# Patient Record
Sex: MALE | Race: WHITE | Employment: FULL TIME | URBAN - NONMETROPOLITAN AREA
[De-identification: names, ages, dates, MRNs, and addresses within clinical notes are randomized per-mention and may not be internally consistent; named-entity substitution may affect disease eponyms.]

---

## 2022-03-15 ENCOUNTER — TELEPHONE (OUTPATIENT)
Dept: NEUROLOGY | Age: 62
End: 2022-03-15

## 2022-04-29 ENCOUNTER — TELEPHONE (OUTPATIENT)
Dept: NEUROLOGY | Age: 62
End: 2022-04-29

## 2022-04-29 NOTE — TELEPHONE ENCOUNTER
CALLED PATIENT AND LEFT A VM THAT HE HAS TO BRING IN DISC OF CT IF PATIENT DOES NOT HAVE DISC WHEN HE COMES IN HE WILL NEED TO RESCHEDULE.

## 2022-05-06 ENCOUNTER — OFFICE VISIT (OUTPATIENT)
Dept: NEUROLOGY | Age: 62
End: 2022-05-06
Payer: MEDICAID

## 2022-05-06 VITALS
BODY MASS INDEX: 31.71 KG/M2 | HEIGHT: 67 IN | RESPIRATION RATE: 16 BRPM | SYSTOLIC BLOOD PRESSURE: 138 MMHG | HEART RATE: 98 BPM | DIASTOLIC BLOOD PRESSURE: 82 MMHG | WEIGHT: 202 LBS

## 2022-05-06 DIAGNOSIS — Z86.39 HISTORY OF DIABETES MELLITUS: ICD-10-CM

## 2022-05-06 DIAGNOSIS — M79.671 PAIN IN BOTH FEET: ICD-10-CM

## 2022-05-06 DIAGNOSIS — G62.9 NEUROPATHY: ICD-10-CM

## 2022-05-06 DIAGNOSIS — M79.672 PAIN IN BOTH FEET: ICD-10-CM

## 2022-05-06 DIAGNOSIS — G62.9 NEUROPATHY: Primary | ICD-10-CM

## 2022-05-06 LAB
FOLATE: 13.8 NG/ML (ref 4.5–32.2)
SEDIMENTATION RATE, ERYTHROCYTE: 41 MM/HR (ref 0–15)
VITAMIN B-12: 379 PG/ML (ref 211–946)

## 2022-05-06 PROCEDURE — 3017F COLORECTAL CA SCREEN DOC REV: CPT | Performed by: PSYCHIATRY & NEUROLOGY

## 2022-05-06 PROCEDURE — G8417 CALC BMI ABV UP PARAM F/U: HCPCS | Performed by: PSYCHIATRY & NEUROLOGY

## 2022-05-06 PROCEDURE — G8427 DOCREV CUR MEDS BY ELIG CLIN: HCPCS | Performed by: PSYCHIATRY & NEUROLOGY

## 2022-05-06 PROCEDURE — 4004F PT TOBACCO SCREEN RCVD TLK: CPT | Performed by: PSYCHIATRY & NEUROLOGY

## 2022-05-06 PROCEDURE — 99204 OFFICE O/P NEW MOD 45 MIN: CPT | Performed by: PSYCHIATRY & NEUROLOGY

## 2022-05-06 RX ORDER — GABAPENTIN 300 MG/1
CAPSULE ORAL
COMMUNITY
Start: 2022-04-20

## 2022-05-06 RX ORDER — IBUPROFEN 200 MG
TABLET ORAL
COMMUNITY

## 2022-05-06 RX ORDER — TADALAFIL 20 MG/1
TABLET ORAL
COMMUNITY

## 2022-05-06 RX ORDER — DULOXETIN HYDROCHLORIDE 30 MG/1
30 CAPSULE, DELAYED RELEASE ORAL DAILY
Qty: 30 CAPSULE | Refills: 2 | Status: SHIPPED | OUTPATIENT
Start: 2022-05-06 | End: 2022-06-03 | Stop reason: SDUPTHER

## 2022-05-06 RX ORDER — CARVEDILOL 25 MG/1
TABLET ORAL
COMMUNITY
Start: 2022-04-20

## 2022-05-06 NOTE — PROGRESS NOTES
REVIEW OF SYSTEMS    Constitutional: []Fever []Sweats []Chills [] Recent Injury   [x] Denies all unless marked  HENT:[]Headache  [] Head Injury  [] Sore Throat  [] Ear Pain  [] Dizziness [] Hearing Loss   [x] Denies all unless marked  Musculoskeletal: [] Arthralgia  [] Myalgias [] Muscle cramps  [] Muscle twitches   [x] Denies all unless marked   Spine:  [] Neck pain  [] Back pain  [] Sciaticia  [x] Denies all unless marked  Neurological:[] Visual Disturbance [] Double Vision [] Slurred Speech [] Trouble swallowing  [] Vertigo [x] Tingling [x] Numbness [] Weakness [] Loss of Balance   [x] Loss of Consciousness [] Memory Loss [] Seizures  [] Denies all unless marked  Psychiatric/Behavioral:[] Depression [] Anxiety  [x] Denies all unless marked  Sleep: []  Insomnia [] Sleep Disturbance [] Snoring [] Restless Legs [] Daytime Sleepiness [] Sleep Apnea  [x] Denies all unless marked
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
## 2022-05-08 LAB
ANA IGG, ELISA: NORMAL
ARSENIC BLOOD: <10 UG/L
CADMIUM: <1 UG/L
GLIADIN ANTIBODIES IGA: 4 UNITS (ref 0–19)
GLIADIN ANTIBODIES IGG: 2 UNITS (ref 0–19)
LEAD LEVEL BLOOD: <2 UG/DL
MERCURY BLOOD: <2.5 UG/L

## 2022-05-09 LAB
ALBUMIN SERPL-MCNC: 4.64 G/DL (ref 3.75–5.01)
ALPHA-1-GLOBULIN: 0.34 G/DL (ref 0.19–0.46)
ALPHA-2-GLOBULIN: 1.01 G/DL (ref 0.48–1.05)
BETA GLOBULIN: 1.02 G/DL (ref 0.48–1.1)
GAMMA GLOBULIN: 1.2 G/DL (ref 0.62–1.51)
IMMUNOFIXATION REFLEX: NORMAL
METHYLMALONIC ACID: 0.11 UMOL/L (ref 0–0.4)
SPE/IFE INTERPRETATION: NORMAL
TOTAL PROTEIN: 8.2 G/DL (ref 6.3–8.2)

## 2022-05-11 LAB — VITAMIN B6: 31.2 NMOL/L (ref 20–125)

## 2022-05-17 ENCOUNTER — HOSPITAL ENCOUNTER (OUTPATIENT)
Dept: NEUROLOGY | Age: 62
Discharge: HOME | End: 2022-05-17
Payer: MEDICAID

## 2022-05-17 DIAGNOSIS — G62.9 NEUROPATHY: ICD-10-CM

## 2022-05-17 DIAGNOSIS — M79.671 PAIN IN BOTH FEET: ICD-10-CM

## 2022-05-17 DIAGNOSIS — M79.672 PAIN IN BOTH FEET: ICD-10-CM

## 2022-05-17 PROCEDURE — 95909 NRV CNDJ TST 5-6 STUDIES: CPT

## 2022-05-17 PROCEDURE — 95909 NRV CNDJ TST 5-6 STUDIES: CPT | Performed by: PSYCHIATRY & NEUROLOGY

## 2022-05-17 PROCEDURE — 95886 MUSC TEST DONE W/N TEST COMP: CPT

## 2022-05-17 PROCEDURE — 95886 MUSC TEST DONE W/N TEST COMP: CPT | Performed by: PSYCHIATRY & NEUROLOGY

## 2022-05-17 NOTE — PROCEDURES
[facility] [address] [facility]                             ELECTROMYOGRAM REPORT    PATIENT NAME: Merry Crocker                    :        1960  MED REC NO:   [id number]                              ROOM:  ACCOUNT NO:   [de-identified]                           ADMIT DATE: 2022  PROVIDER:     Kalen Lind MD    DATE OF EM2022    EMG/NERVE STUDY BILATERAL LOWER EXTREMITIES    INDICATION FOR TEST:  Bilateral lower extremity pain. SUMMARY:  Nerve conduction studies of the bilateral lower extremities  show absent sural and left peroneal and tibial responses. Obtainable  right peroneal and tibial responses show very low amplitude nerves. The  right tibial response is absent as well. Needle exam of the bilateral lower extremities is unremarkable. IMPRESSION:  Severe sensory motor polyneuropathy. Correlate clinically.         Herb Astudillo MD    D: 2022 12:25:29      T: 2022 12:38:46     [id number]  Job#: [id number]     Doc#: [id number]    CC:

## 2022-05-25 ENCOUNTER — TELEPHONE (OUTPATIENT)
Dept: NEUROLOGY | Age: 62
End: 2022-05-25

## 2022-05-25 NOTE — TELEPHONE ENCOUNTER
Caprice King from [facility] Dr[facility] Thom Steel requesting that notes from pts first visit and results from the EEG be sent to their office via Fax [phone]. Thanks!

## 2022-05-25 NOTE — TELEPHONE ENCOUNTER
Pt hasnt been seen in our office. Those note are from neurology and they will have to be the ones to release and fax those notes.  We cant release anything not from our providers

## 2022-06-03 ENCOUNTER — OFFICE VISIT (OUTPATIENT)
Dept: NEUROLOGY | Age: 62
End: 2022-06-03
Payer: MEDICAID

## 2022-06-03 VITALS
DIASTOLIC BLOOD PRESSURE: 64 MMHG | HEIGHT: 67 IN | RESPIRATION RATE: 18 BRPM | SYSTOLIC BLOOD PRESSURE: 129 MMHG | HEART RATE: 90 BPM | WEIGHT: 199 LBS | BODY MASS INDEX: 31.23 KG/M2

## 2022-06-03 DIAGNOSIS — Z86.39 HISTORY OF DIABETES MELLITUS: ICD-10-CM

## 2022-06-03 DIAGNOSIS — G62.9 NEUROPATHY: Primary | ICD-10-CM

## 2022-06-03 DIAGNOSIS — M79.672 PAIN IN BOTH FEET: ICD-10-CM

## 2022-06-03 DIAGNOSIS — M79.671 PAIN IN BOTH FEET: ICD-10-CM

## 2022-06-03 PROCEDURE — G8427 DOCREV CUR MEDS BY ELIG CLIN: HCPCS | Performed by: PSYCHIATRY & NEUROLOGY

## 2022-06-03 PROCEDURE — 99213 OFFICE O/P EST LOW 20 MIN: CPT | Performed by: PSYCHIATRY & NEUROLOGY

## 2022-06-03 PROCEDURE — 3017F COLORECTAL CA SCREEN DOC REV: CPT | Performed by: PSYCHIATRY & NEUROLOGY

## 2022-06-03 PROCEDURE — G8417 CALC BMI ABV UP PARAM F/U: HCPCS | Performed by: PSYCHIATRY & NEUROLOGY

## 2022-06-03 PROCEDURE — 4004F PT TOBACCO SCREEN RCVD TLK: CPT | Performed by: PSYCHIATRY & NEUROLOGY

## 2022-06-03 RX ORDER — DULOXETIN HYDROCHLORIDE 60 MG/1
60 CAPSULE, DELAYED RELEASE ORAL DAILY
Qty: 30 CAPSULE | Refills: 5 | Status: SHIPPED | OUTPATIENT
Start: 2022-06-03

## 2022-06-03 NOTE — PROGRESS NOTES
REVIEW OF SYSTEMS    Constitutional: []Fever []Sweats []Chills [] Recent Injury   [x] Denies all unless marked  HENT:[]Headache  [] Head Injury  [] Sore Throat  [] Ear Pain  [] Dizziness [] Hearing Loss   [x] Denies all unless marked  Musculoskeletal: [] Arthralgia  [] Myalgias [] Muscle cramps  [] Muscle twitches   [x] Denies all unless marked   Spine:  [] Neck pain  [] Back pain  [] Sciaticia  [x] Denies all unless marked  Neurological:[] Visual Disturbance [] Double Vision [] Slurred Speech [] Trouble swallowing  [] Vertigo [] Tingling [] Numbness [x] Weakness [] Loss of Balance   [] Loss of Consciousness [] Memory Loss [] Seizures  [] Denies all unless marked  Psychiatric/Behavioral:[] Depression [] Anxiety  [x] Denies all unless marked  Sleep: []  Insomnia [] Sleep Disturbance [] Snoring [] Restless Legs [] Daytime Sleepiness [] Sleep Apnea  [x] Denies all unless marked
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
## 2022-09-07 ENCOUNTER — OFFICE VISIT (OUTPATIENT)
Dept: NEUROLOGY | Age: 62
End: 2022-09-07
Payer: MEDICAID

## 2022-09-07 VITALS
HEIGHT: 67 IN | HEART RATE: 97 BPM | SYSTOLIC BLOOD PRESSURE: 134 MMHG | BODY MASS INDEX: 29.82 KG/M2 | DIASTOLIC BLOOD PRESSURE: 73 MMHG | RESPIRATION RATE: 16 BRPM | WEIGHT: 190 LBS

## 2022-09-07 DIAGNOSIS — G62.9 NEUROPATHY: Primary | ICD-10-CM

## 2022-09-07 DIAGNOSIS — M79.672 PAIN IN BOTH FEET: ICD-10-CM

## 2022-09-07 DIAGNOSIS — Z86.39 HISTORY OF DIABETES MELLITUS: ICD-10-CM

## 2022-09-07 DIAGNOSIS — M79.671 PAIN IN BOTH FEET: ICD-10-CM

## 2022-09-07 PROCEDURE — 99214 OFFICE O/P EST MOD 30 MIN: CPT | Performed by: PSYCHIATRY & NEUROLOGY

## 2022-09-07 PROCEDURE — 4004F PT TOBACCO SCREEN RCVD TLK: CPT | Performed by: PSYCHIATRY & NEUROLOGY

## 2022-09-07 PROCEDURE — 3017F COLORECTAL CA SCREEN DOC REV: CPT | Performed by: PSYCHIATRY & NEUROLOGY

## 2022-09-07 PROCEDURE — G8417 CALC BMI ABV UP PARAM F/U: HCPCS | Performed by: PSYCHIATRY & NEUROLOGY

## 2022-09-07 PROCEDURE — G8427 DOCREV CUR MEDS BY ELIG CLIN: HCPCS | Performed by: PSYCHIATRY & NEUROLOGY

## 2022-09-07 RX ORDER — NAPROXEN 500 MG/1
TABLET ORAL
COMMUNITY

## 2022-09-07 RX ORDER — NORTRIPTYLINE HYDROCHLORIDE 10 MG/1
CAPSULE ORAL
Qty: 60 CAPSULE | Refills: 3 | Status: SHIPPED | OUTPATIENT
Start: 2022-09-07
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
## 2022-12-08 ENCOUNTER — OFFICE VISIT (OUTPATIENT)
Dept: NEUROLOGY | Age: 62
End: 2022-12-08
Payer: MEDICAID

## 2022-12-08 VITALS
HEART RATE: 97 BPM | HEIGHT: 67 IN | OXYGEN SATURATION: 97 % | WEIGHT: 190 LBS | SYSTOLIC BLOOD PRESSURE: 132 MMHG | DIASTOLIC BLOOD PRESSURE: 77 MMHG | BODY MASS INDEX: 29.82 KG/M2

## 2022-12-08 DIAGNOSIS — M79.671 PAIN IN BOTH FEET: ICD-10-CM

## 2022-12-08 DIAGNOSIS — Z86.39 HISTORY OF DIABETES MELLITUS: ICD-10-CM

## 2022-12-08 DIAGNOSIS — G62.9 NEUROPATHY: Primary | ICD-10-CM

## 2022-12-08 DIAGNOSIS — M79.672 PAIN IN BOTH FEET: ICD-10-CM

## 2022-12-08 PROCEDURE — G8484 FLU IMMUNIZE NO ADMIN: HCPCS | Performed by: PSYCHIATRY & NEUROLOGY

## 2022-12-08 PROCEDURE — G8417 CALC BMI ABV UP PARAM F/U: HCPCS | Performed by: PSYCHIATRY & NEUROLOGY

## 2022-12-08 PROCEDURE — 99214 OFFICE O/P EST MOD 30 MIN: CPT | Performed by: PSYCHIATRY & NEUROLOGY

## 2022-12-08 PROCEDURE — G8427 DOCREV CUR MEDS BY ELIG CLIN: HCPCS | Performed by: PSYCHIATRY & NEUROLOGY

## 2022-12-08 PROCEDURE — 3017F COLORECTAL CA SCREEN DOC REV: CPT | Performed by: PSYCHIATRY & NEUROLOGY

## 2022-12-08 PROCEDURE — 4004F PT TOBACCO SCREEN RCVD TLK: CPT | Performed by: PSYCHIATRY & NEUROLOGY

## 2022-12-08 RX ORDER — DULOXETIN HYDROCHLORIDE 60 MG/1
60 CAPSULE, DELAYED RELEASE ORAL DAILY
Qty: 30 CAPSULE | Refills: 5 | Status: SHIPPED | OUTPATIENT
Start: 2022-12-08

## 2022-12-08 RX ORDER — HYDROCODONE BITARTRATE AND ACETAMINOPHEN 7.5; 325 MG/1; MG/1
1 TABLET ORAL EVERY 6 HOURS PRN
Qty: 30 TABLET | Refills: 0 | Status: SHIPPED | OUTPATIENT
Start: 2022-12-08 | End: 2023-01-07
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
## 2022-12-08 NOTE — PROGRESS NOTES
REVIEW OF SYSTEMS    Constitutional: []Fever []Sweat []Chills [] Recent Injury [x] Denies all unless marked  HEENT:[]Headache  [] Head Injury/Hearing Loss  [] Sore Throat  [] Ear Ache/Dizziness  [x] Denies all unless marked  Spine:  [] Neck pain  [] Back pain  [] Sciaticia  [x] Denies all unless marked  Cardiovascular:[]Heart Disease []Chest Pain [] Palpitations  [x] Denies all unless marked  Pulmonary: []Shortness of Breath []Cough   [x] Denies all unless marke  Gastrointestinal: []Nausea  []Vomiting  []Abdominal Pain  []Constipation  []Diarrhea  []Dark Bloody Stools  [x] Denies all unless marked  Psychiatric/Behavioral:[] Depression [] Anxiety [x] Denies all unless marked  Genitourinary:   [] Frequency  [] Urgency  [] Incontinence [] Pain with Urination  [x] Denies all unless marked  Extremities: [x]Pain  []Swelling  [x] Denies all unless marked  Musculoskeletal: [] Muscle Pain  [] Joint Pain  [] Arthritis [] Muscle Cramps [] Muscle Twitches  [x] Denies all unless marked  Sleep: [] Insomnia [] Snoring [] Restless Legs [] Sleep Apnea  [] Daytime Sleepiness  [x] Denies all unless marked  Skin:[] Rash [] Skin Discoloration [x] Denies all unless marked   Neurological: []Visual Disturbance/Memory Loss [] Loss of Balance [] Slurred Speech/Weakness [] Seizures  [] Vertigo/Dizziness [x] Denies all unless marked

## 2022-12-12 ENCOUNTER — TELEPHONE (OUTPATIENT)
Dept: NEUROLOGY | Age: 62
End: 2022-12-12

## 2022-12-12 NOTE — TELEPHONE ENCOUNTER
PA for patients Norco submitted via [facility] portal. Currently awaiting insurance response.  See details of authorization below:     Wilbert Putnam Monge: [facility]   PA Case ID: [id number]   Rx #: [id number]  Status  Sent to Plan today  Drug  HYDROcodone-Acetaminophen 7.5-325MG tablets  Form  [address] [facility]

## 2022-12-12 NOTE — TELEPHONE ENCOUNTER
Pt called and states he needs a PA for Hydrocodone please.  Pharmacy is [facility] in [place], [place].

## 2022-12-13 NOTE — TELEPHONE ENCOUNTER
[facility] Monge: Tiffanie LAGUNA Case ID: [id number] - Rx #: [id number] help? Call us at [phone]  Outcome  [place] December 12  The request has been approved. The authorization is effective for a maximum of 12 fills from 12/12/2022 to 12/11/2023, as long as the member is enrolled in their current health plan. The request was approved as submitted. A written notification letter will follow with additional details. Drug  HYDROcodone-Acetaminophen 7.5-325MG tablets  Form  [facility] Form 2017 [employer]  Original Claim Info  920,17 TRANS FEE = 0.00OPIOID MAXIMUM DURATION EXCEEDED.  PRESCRIBER MUST INITIATE THE PA REQUESTSUBMITTED DS LIMIT EXCEEDED0.00

## 2023-02-01 DIAGNOSIS — G62.9 NEUROPATHY: ICD-10-CM

## 2023-02-01 NOTE — TELEPHONE ENCOUNTER
Requested Prescriptions     Pending Prescriptions Disp Refills    HYDROcodone-acetaminophen (NORCO) 7.5-325 MG per tablet 30 tablet 0     Sig: Take 1 tablet by mouth every 6 hours as needed for Pain for up to 30 days.  Take 1 each pm as needed       Last Office Visit:  12/8/2022  Next Office Visit:  3/9/2023  Last Medication Refill: 12/14/2022 with 0 RENETTA Lopez up to date:  2/1/23     *RX updated to reflect   2/1/23  fill date*

## 2023-02-02 RX ORDER — HYDROCODONE BITARTRATE AND ACETAMINOPHEN 7.5; 325 MG/1; MG/1
1 TABLET ORAL EVERY 6 HOURS PRN
Qty: 30 TABLET | Refills: 0 | Status: SHIPPED | OUTPATIENT
Start: 2023-02-02 | End: 2023-03-04

## 2023-03-09 ENCOUNTER — OFFICE VISIT (OUTPATIENT)
Dept: NEUROLOGY | Age: 63
End: 2023-03-09

## 2023-03-09 VITALS
WEIGHT: 190 LBS | BODY MASS INDEX: 29.82 KG/M2 | OXYGEN SATURATION: 95 % | HEART RATE: 102 BPM | DIASTOLIC BLOOD PRESSURE: 76 MMHG | SYSTOLIC BLOOD PRESSURE: 154 MMHG | HEIGHT: 67 IN

## 2023-03-09 DIAGNOSIS — G62.9 NEUROPATHY: Primary | ICD-10-CM

## 2023-03-09 DIAGNOSIS — M79.671 PAIN IN BOTH FEET: ICD-10-CM

## 2023-03-09 DIAGNOSIS — M79.672 PAIN IN BOTH FEET: ICD-10-CM

## 2023-03-09 DIAGNOSIS — Z86.39 HISTORY OF DIABETES MELLITUS: ICD-10-CM

## 2023-03-09 RX ORDER — HYDROCODONE BITARTRATE AND ACETAMINOPHEN 7.5; 325 MG/1; MG/1
1 TABLET ORAL NIGHTLY PRN
COMMUNITY

## 2023-03-09 RX ORDER — ROSUVASTATIN CALCIUM 5 MG/1
TABLET, COATED ORAL
COMMUNITY
Start: 2023-02-22

## 2023-03-09 NOTE — PROGRESS NOTES
REVIEW OF SYSTEMS    Constitutional: []Fever []Sweats []Chills [] Recent Injury   [x] Denies all unless marked  HENT:[]Headache  [] Head Injury  [] Sore Throat  [x] Ear Pain  [] Dizziness [] Hearing Loss   [] Denies all unless marked  Spine:  [] Neck pain  [x] Back pain  [x] Sciatica  [] Denies all unless marked  Cardiovascular:[]Chest Pain []Palpitations [] Heart Disease  [x] Denies all unless marked  Pulmonary: []Shortness of Breath []Cough   [x] Denies all unless marked  Gastrointestinal:  []Abdominal Pain  []Blood in Stool  [x]Diarrhea []Constipation []Nausea  []Vomiting  [] Denies all unless marked  Genitourinary:  [] Dysuria [] Frequency  [] Incontinence [] Urgency   [x] Denies all unless marked  Musculoskeletal: [x] Arthralgia  [x] Myalgias [x] Muscle cramps  [] Muscle twitches   [] Denies all unless marked   Extremities:   [x] Pain   [] Swelling   [] Denies all unless marked  Skin:[] Rash  [] Color Change  [x] Denies all unless marked  Neurological:[] Visual Disturbance [] Double Vision [] Slurred Speech [] Trouble swallowing  [] Vertigo [] Tingling [] Numbness [] Weakness [] Loss of Balance   [] Loss of Consciousness [] Memory Loss [] Seizures  [x] Denies all unless marked  Psychiatric/Behavioral:[] Depression [] Anxiety  [x] Denies all unless marked  Sleep: []  Insomnia [] Sleep Disturbance [] Snoring [] Restless Legs [] Daytime Sleepiness [] Sleep Apnea  [x] Denies all unless marked
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
## 2023-03-10 DIAGNOSIS — G62.9 NEUROPATHY: ICD-10-CM

## 2023-03-10 NOTE — TELEPHONE ENCOUNTER
Patient called stating that Dr. Fco Hartley was supposed to call patient in [place] and nothing has been called in.  Pt was just calling to see when that would be called in?

## 2023-03-13 RX ORDER — HYDROCODONE BITARTRATE AND ACETAMINOPHEN 7.5; 325 MG/1; MG/1
1 TABLET ORAL EVERY 6 HOURS PRN
Qty: 30 TABLET | Refills: 0 | Status: SHIPPED | OUTPATIENT
Start: 2023-03-13 | End: 2023-04-12

## 2023-03-13 NOTE — TELEPHONE ENCOUNTER
Requested Prescriptions     Pending Prescriptions Disp Refills    HYDROcodone-acetaminophen (NORCO) 7.5-325 MG per tablet 30 tablet 0     Sig: Take 1 tablet by mouth every 6 hours as needed for Pain for up to 30 days.  Take 1 each pm as needed       Last Office Visit:  3/9/2023  Next Office Visit:  6/12/2023  Last Medication Refill:  2/2/2023 with 0 RF   Jacinda Listen up to date:  2/1/2023     *RX updated to reflect   3/13/2023  fill date*

## 2023-03-29 ENCOUNTER — TELEPHONE (OUTPATIENT)
Dept: VASCULAR SURGERY | Age: 63
End: 2023-03-29

## 2023-03-29 NOTE — TELEPHONE ENCOUNTER
Called and lvm with patient to schedule from referral. If patient returns call, please place them in a 30 minute new patient slot with Sun Rivera as NP- [id number]-10-CM Occlusion and stenosis of left carotid artery R09.89ICD-10-CM Other specified symptoms and signs involving the circulatory and respiratory systems- [id number]

## 2023-04-04 ENCOUNTER — OFFICE VISIT (OUTPATIENT)
Dept: VASCULAR SURGERY | Age: 63
End: 2023-04-04
Payer: MEDICAID

## 2023-04-04 ENCOUNTER — HOSPITAL ENCOUNTER (OUTPATIENT)
Dept: VASCULAR LAB | Age: 63
Discharge: HOME | End: 2023-04-04
Payer: MEDICAID

## 2023-04-04 VITALS
DIASTOLIC BLOOD PRESSURE: 64 MMHG | TEMPERATURE: 96.9 F | OXYGEN SATURATION: 98 % | BODY MASS INDEX: 29.82 KG/M2 | SYSTOLIC BLOOD PRESSURE: 110 MMHG | WEIGHT: 190 LBS | HEIGHT: 67 IN | HEART RATE: 97 BPM

## 2023-04-04 DIAGNOSIS — I65.23 BILATERAL CAROTID ARTERY STENOSIS: ICD-10-CM

## 2023-04-04 DIAGNOSIS — I65.23 BILATERAL CAROTID ARTERY STENOSIS: Primary | ICD-10-CM

## 2023-04-04 PROCEDURE — 93880 EXTRACRANIAL BILAT STUDY: CPT

## 2023-04-04 PROCEDURE — 99204 OFFICE O/P NEW MOD 45 MIN: CPT | Performed by: PHYSICIAN ASSISTANT

## 2023-04-04 RX ORDER — ASPIRIN 325 MG/1
325 TABLET, COATED ORAL DAILY
COMMUNITY
Start: 2023-03-17

## 2023-04-04 RX ORDER — FENOFIBRATE 145 MG/1
TABLET, COATED ORAL
COMMUNITY
Start: 2023-01-29 | End: 2023-04-04

## 2023-04-04 RX ORDER — CHOLECALCIFEROL (VITAMIN D3) 25 MCG
1 TABLET ORAL DAILY
COMMUNITY
Start: 2023-02-24

## 2023-04-04 NOTE — PROGRESS NOTES
HYDROcodone-acetaminophen (NORCO) 7.5-325 MG per tablet Take 1 tablet by mouth every 6 hours as needed for Pain for up to 30 days. Take 1 each pm as needed 30 tablet 0    cyanocobalamin 1000 MCG tablet Take 1 tablet by mouth daily      rosuvastatin (CRESTOR) 5 MG tablet TAKE 1 TABLET BY MOUTH ONCE DAILY      DULoxetine (CYMBALTA) 60 MG extended release capsule Take 1 capsule by mouth daily 30 capsule 5    naproxen (NAPROSYN) 500 MG tablet naproxen 500 mg tablet      carvedilol (COREG) 25 MG tablet TAKE 1 TABLET BY MOUTH TWICE DAILY DOSE INCREASE FROM 12.5MG TO 25MG      gabapentin (NEURONTIN) 300 MG capsule TAKE 2 CAPSULES BY MOUTH THREE TIMES DAILY      metFORMIN (GLUCOPHAGE) 1000 MG tablet TAKE 1 TABLET BY MOUTH TWICE DAILY      tadalafil (CIALIS) 20 MG tablet tadalafil 20 mg tablet   Take 1 tablet every day by oral route. HYDROcodone-acetaminophen (NORCO) 7.5-325 MG per tablet Take 1 tablet by mouth nightly as needed for Pain. (Patient not taking: Reported on 4/4/2023)       No current facility-administered medications for this visit. Allergies: Propoxyphene and Soybean-containing drug products  History reviewed. No pertinent past medical history. Past Surgical History:   Procedure Laterality Date    INGUINAL HERNIA REPAIR      MANDIBLE SURGERY      SHOULDER SURGERY Left      History reviewed. No pertinent family history. Social History     Tobacco Use    Smoking status: Every Day     Packs/day: 0.50     Years: 48.00     Pack years: 24.00     Types: Cigarettes     Passive exposure: Current    Smokeless tobacco: Never   Substance Use Topics    Alcohol use: Never       Review of Systems    Constitutional -   No fever or chills   HENT - no HA's, tinnitus, rhinorrhea, sore throat  Eyes - no sudden vision change or amaurosis. Respiratory - no SOB or chest pain  Cardiovascular - no chest pain, syncope, or significant dizziness. No palpitations (see HPI)  Gastrointestinal - no significant abdominal pain.

## 2023-04-05 ENCOUNTER — TELEPHONE (OUTPATIENT)
Dept: VASCULAR SURGERY | Age: 63
End: 2023-04-05

## 2023-04-05 DIAGNOSIS — I65.23 BILATERAL CAROTID ARTERY STENOSIS: Primary | ICD-10-CM

## 2023-04-05 NOTE — TELEPHONE ENCOUNTER
Spoke with the patient to let him know the following. We have him scheduled for his CTA of the neck. The patient is aware of the date and time and to be npo 4-6 hours prior. ----- Message from Annabelle Davison PA-C sent at 4/5/2023 11:03 AM CDT -----  Please call the patient and let him know I reviewed his carotid ultrasound he does have significant blockage in his left carotid artery. I recommend we get a CTA of the neck for better imaging and to discuss possible surgery. If he is agreeable please place order.   Thank you

## 2023-04-28 ENCOUNTER — TELEPHONE (OUTPATIENT)
Dept: VASCULAR SURGERY | Age: 63
End: 2023-04-28

## 2023-04-28 DIAGNOSIS — G62.9 NEUROPATHY: ICD-10-CM

## 2023-04-28 RX ORDER — HYDROCODONE BITARTRATE AND ACETAMINOPHEN 7.5; 325 MG/1; MG/1
1 TABLET ORAL EVERY 6 HOURS PRN
Qty: 30 TABLET | Refills: 0 | Status: SHIPPED | OUTPATIENT
Start: 2023-04-28 | End: 2023-05-28

## 2023-04-28 NOTE — TELEPHONE ENCOUNTER
Requested Prescriptions     Pending Prescriptions Disp Refills    HYDROcodone-acetaminophen (NORCO) 7.5-325 MG per tablet 30 tablet 0     Sig: Take 1 tablet by mouth every 6 hours as needed for Pain for up to 30 days.  Take 1 each pm as needed       Last Office Visit:  3/9/2023  Next Office Visit:  6/12/2023  Last Medication Refill:  3/13/23  Jonny Crocker up to date:  2/1/23    *RX updated to reflect   4/28/23  fill date*

## 2023-06-19 DIAGNOSIS — G62.9 NEUROPATHY: ICD-10-CM

## 2023-06-19 RX ORDER — HYDROCODONE BITARTRATE AND ACETAMINOPHEN 7.5; 325 MG/1; MG/1
1 TABLET ORAL EVERY 6 HOURS PRN
Qty: 30 TABLET | Refills: 0 | Status: SHIPPED | OUTPATIENT
Start: 2023-06-19 | End: 2023-07-19

## 2023-06-19 NOTE — TELEPHONE ENCOUNTER
Requested Prescriptions     Pending Prescriptions Disp Refills    HYDROcodone-acetaminophen (NORCO) 7.5-325 MG per tablet 30 tablet 0     Sig: Take 1 tablet by mouth every 6 hours as needed for Pain for up to 30 days.  Take 1 each pm as needed       Last Office Visit:  6/15/2023  Next Office Visit:  9/18/2023  Last Medication Refill:  4/28/2023 with 0 RENETTA Rivas up to date:  2/1/2023 unable to update due to web site being down    *RX updated to reflect   6/19/2023   fill date*

## 2023-08-01 DIAGNOSIS — G62.9 NEUROPATHY: ICD-10-CM

## 2023-08-01 NOTE — TELEPHONE ENCOUNTER
Requested Prescriptions     Pending Prescriptions Disp Refills    HYDROcodone-acetaminophen (NORCO) 7.5-325 MG per tablet 30 tablet 0     Sig: Take 1 tablet by mouth every 6 hours as needed for Pain for up to 30 days.  Take 1 each pm as needed       Last Office Visit:  6/15/2023  Next Office Visit:  9/18/2023  Last Medication Refill:  6/19/2023 with 0 RENETTA Lindsey up to date:  8/1/2023    *RX updated to reflect   8/1/2023  fill date*

## 2023-08-02 RX ORDER — HYDROCODONE BITARTRATE AND ACETAMINOPHEN 7.5; 325 MG/1; MG/1
1 TABLET ORAL EVERY 6 HOURS PRN
Qty: 30 TABLET | Refills: 0 | Status: SHIPPED | OUTPATIENT
Start: 2023-08-02 | End: 2023-09-01

## 2023-09-11 DIAGNOSIS — G62.9 NEUROPATHY: ICD-10-CM

## 2023-09-11 RX ORDER — HYDROCODONE BITARTRATE AND ACETAMINOPHEN 7.5; 325 MG/1; MG/1
1 TABLET ORAL EVERY 6 HOURS PRN
Qty: 30 TABLET | Refills: 0 | Status: SHIPPED | OUTPATIENT
Start: 2023-09-11 | End: 2023-10-11

## 2023-09-11 NOTE — TELEPHONE ENCOUNTER
Requested Prescriptions     Pending Prescriptions Disp Refills    HYDROcodone-acetaminophen (NORCO) 7.5-325 MG per tablet 30 tablet 0     Sig: Take 1 tablet by mouth every 6 hours as needed for Pain for up to 30 days.  Take 1 each pm as needed       Last Office Visit:  6/15/2023  Next Office Visit:  9/18/2023  Last Medication Refill:  8/2/23  Claudio Luevano up to date:  9/11/23    *RX updated to reflect   9/11/23  fill date*

## 2023-09-18 ENCOUNTER — TELEPHONE (OUTPATIENT)
Dept: NEUROLOGY | Age: 63
End: 2023-09-18

## 2023-09-18 ENCOUNTER — OFFICE VISIT (OUTPATIENT)
Dept: NEUROLOGY | Age: 63
End: 2023-09-18
Payer: MEDICAID

## 2023-09-18 VITALS
SYSTOLIC BLOOD PRESSURE: 139 MMHG | DIASTOLIC BLOOD PRESSURE: 82 MMHG | BODY MASS INDEX: 31.39 KG/M2 | WEIGHT: 200 LBS | HEIGHT: 67 IN | HEART RATE: 98 BPM

## 2023-09-18 DIAGNOSIS — M54.42 CHRONIC LEFT-SIDED LOW BACK PAIN WITH LEFT-SIDED SCIATICA: ICD-10-CM

## 2023-09-18 DIAGNOSIS — Z86.39 HISTORY OF DIABETES MELLITUS: ICD-10-CM

## 2023-09-18 DIAGNOSIS — M79.671 PAIN IN BOTH FEET: ICD-10-CM

## 2023-09-18 DIAGNOSIS — G62.9 NEUROPATHY: Primary | ICD-10-CM

## 2023-09-18 DIAGNOSIS — M79.672 PAIN IN BOTH FEET: ICD-10-CM

## 2023-09-18 DIAGNOSIS — G89.29 CHRONIC LEFT-SIDED LOW BACK PAIN WITH LEFT-SIDED SCIATICA: ICD-10-CM

## 2023-09-18 PROCEDURE — 99214 OFFICE O/P EST MOD 30 MIN: CPT | Performed by: PSYCHIATRY & NEUROLOGY

## 2023-09-18 NOTE — PROGRESS NOTES
REVIEW OF SYSTEMS    Constitutional: []Fever []Sweat []Chills [] Recent Injury [x] Denies all unless marked  HEENT:[]Headache  [] Head Injury/Hearing Loss  [] Sore Throat  [] Ear Ache/Dizziness  [x] Denies all unless marked  Spine:  [] Neck pain  [] Back pain  [] Sciaticia  [x] Denies all unless marked  Cardiovascular:[]Heart Disease []Chest Pain [] Palpitations  [x] Denies all unless marked  Pulmonary: []Shortness of Breath []Cough   [x] Denies all unless marke  Gastrointestinal: []Nausea  []Vomiting  []Abdominal Pain  []Constipation  []Diarrhea  []Dark Bloody Stools  [x] Denies all unless marked  Psychiatric/Behavioral:[] Depression [] Anxiety [x] Denies all unless marked  Genitourinary:   [] Frequency  [] Urgency  [] Incontinence [] Pain with Urination  [x] Denies all unless marked  Extremities: []Pain  []Swelling  [x] Denies all unless marked  Musculoskeletal: [x] Muscle Pain  [] Joint Pain  [] Arthritis [x] Muscle Cramps [] Muscle Twitches  [x] Denies all unless marked  Sleep: [] Insomnia [] Snoring [] Restless Legs [] Sleep Apnea  [] Daytime Sleepiness  [x] Denies all unless marked  Skin:[] Rash [] Skin Discoloration [x] Denies all unless marked   Neurological: []Visual Disturbance/Memory Loss [] Loss of Balance [] Slurred Speech/Weakness [] Seizures  [] Vertigo/Dizziness [x] Denies all unless marked
alert, fluent oriented x 3 appropriate affect  Attention and concentration appear appropriate  Recent and remote memory appears unremarkable  Speech normal without dysarthria  No clear issues with language of fund of knowledge    Cranial Nerve Exam   CN II- Visual fields grossly unremarkable. VA adequate. CN III, IV,VI- PERRLA, EOMI, No nystagmus, conjugate eye movements, no ptosis  CN VII-slight facial asymmetry  CN VIII-Hearing intact   CN IX and X- Palate elevates in midline  CN XI-good shoulder shrug  CN XII-Tongue midline with no fasciculations or fibrillations    Motor Exam  V/V throughout upper and lower extremities bilaterally, no cogwheeling, normal tone      Reflexes trace left ankle. 1-2+ KJ and right AJ    Tremors- no tremors in hands or head noted    Gait  Slow and antalgic    Coordination  Finger to nose and IAN-unremarkable      Assessment    ICD-10-CM    1. Neuropathy  G62.9       2. Pain in both feet  M79.671     M79.672       3. History of diabetes mellitus  Z86.39       4. Chronic left-sided low back pain with left-sided sciatica  M54.42 MRI LUMBAR SPINE WO CONTRAST    G89.29                   Patient has a severe diabetic neuropathy, along with chronic progressive LBP. He will continue on Cymbalta and Norco each evening. .  Diabetes stable  No changes made. Check MRI of the low back and consider referral to neurosurgery if indicated. Plan        Return in about 3 months (around 12/18/2023).     (Please note that portions of this note were completed with a voice recognition program. Efforts were made to edit the dictations but occasionally words are mis-transcribed.)

## 2023-09-18 NOTE — TELEPHONE ENCOUNTER
Called and left patient a VM to let him know when his MRI has been scheduled. Left on VM the appointment time/date/location.

## 2023-10-05 ENCOUNTER — TELEPHONE (OUTPATIENT)
Dept: NEUROLOGY | Age: 63
End: 2023-10-05

## 2023-10-05 ENCOUNTER — HOSPITAL ENCOUNTER (OUTPATIENT)
Dept: MRI IMAGING | Age: 63
Discharge: HOME | End: 2023-10-05
Payer: MEDICAID

## 2023-10-05 DIAGNOSIS — M54.42 CHRONIC LEFT-SIDED LOW BACK PAIN WITH LEFT-SIDED SCIATICA: Primary | ICD-10-CM

## 2023-10-05 DIAGNOSIS — M54.42 CHRONIC LEFT-SIDED LOW BACK PAIN WITH LEFT-SIDED SCIATICA: ICD-10-CM

## 2023-10-05 DIAGNOSIS — G89.29 CHRONIC LEFT-SIDED LOW BACK PAIN WITH LEFT-SIDED SCIATICA: ICD-10-CM

## 2023-10-05 DIAGNOSIS — G89.29 CHRONIC LEFT-SIDED LOW BACK PAIN WITH LEFT-SIDED SCIATICA: Primary | ICD-10-CM

## 2023-10-05 PROCEDURE — 72148 MRI LUMBAR SPINE W/O DYE: CPT

## 2023-10-05 NOTE — TELEPHONE ENCOUNTER
MD Jelena Cervantes MA  His MRI shows some significant findings. I think we should refer him to neurosurgery. If he is okay refer him to one of our guys please. I spoke with pt and voiced that there were several significant findings on his Mri and Dr. Robbie Waggoner recommends a referral to the neurosurgeons at [facility] for an evaluation. Pt was agreeable.

## 2023-10-06 ENCOUNTER — TELEPHONE (OUTPATIENT)
Dept: NEUROSURGERY | Age: 63
End: 2023-10-06

## 2023-10-06 NOTE — TELEPHONE ENCOUNTER
1st attempt to contact patient.  I left a voicemail instructing patient to call back at [phone] to schedule their new patient appointment     Chronic left-sided low back pain with left-sided sciatica    [facility] 10/5/23

## 2023-10-18 ENCOUNTER — OFFICE VISIT (OUTPATIENT)
Dept: NEUROSURGERY | Age: 63
End: 2023-10-18
Payer: MEDICAID

## 2023-10-18 ENCOUNTER — TELEPHONE (OUTPATIENT)
Dept: NEUROLOGY | Age: 63
End: 2023-10-18

## 2023-10-18 VITALS
SYSTOLIC BLOOD PRESSURE: 138 MMHG | BODY MASS INDEX: 31.38 KG/M2 | DIASTOLIC BLOOD PRESSURE: 78 MMHG | WEIGHT: 199.96 LBS | HEIGHT: 67 IN | HEART RATE: 82 BPM | RESPIRATION RATE: 18 BRPM

## 2023-10-18 DIAGNOSIS — M48.062 SPINAL STENOSIS OF LUMBAR REGION WITH NEUROGENIC CLAUDICATION: ICD-10-CM

## 2023-10-18 DIAGNOSIS — M54.16 LUMBAR RADICULOPATHY: ICD-10-CM

## 2023-10-18 DIAGNOSIS — G62.9 NEUROPATHY: ICD-10-CM

## 2023-10-18 DIAGNOSIS — M51.36 DDD (DEGENERATIVE DISC DISEASE), LUMBAR: ICD-10-CM

## 2023-10-18 DIAGNOSIS — Z72.0 TOBACCO ABUSE: ICD-10-CM

## 2023-10-18 DIAGNOSIS — M43.17 SPONDYLOLISTHESIS AT L5-S1 LEVEL: Primary | ICD-10-CM

## 2023-10-18 PROCEDURE — 99204 OFFICE O/P NEW MOD 45 MIN: CPT | Performed by: NEUROLOGICAL SURGERY

## 2023-10-18 ASSESSMENT — ENCOUNTER SYMPTOMS
GASTROINTESTINAL NEGATIVE: 1
BACK PAIN: 1
EYES NEGATIVE: 1
RESPIRATORY NEGATIVE: 1

## 2023-10-18 NOTE — TELEPHONE ENCOUNTER
Patient came into the office requesting a refill on his [address]     Send to [facility]

## 2023-10-18 NOTE — TELEPHONE ENCOUNTER
Requested Prescriptions     Pending Prescriptions Disp Refills    HYDROcodone-acetaminophen (NORCO) 7.5-325 MG per tablet 30 tablet 0     Sig: Take 1 tablet by mouth every 6 hours as needed for Pain for up to 30 days.  Take 1 each pm as needed       Last Office Visit:  9/18/2023  Next Office Visit:  12/18/2023  Last Medication Refill: 9/11/2023 with 0 RENETTA Levy up to date:  9/11/2023    *RX updated to reflect   10/18/2023  fill date*

## 2023-10-18 NOTE — PROGRESS NOTES
[facility] Neurosurgery  Office Visit        Chief Complaint   Patient presents with    New Patient     Establishing care    Results     MRI 10/5/23    Back Pain     Patient is here for progressively worse back pain. He states that pain radiates into left leg. Numbness     Patient states that he has numbness left sided into feet. HISTORY OF PRESENT ILLNESS:      The patient is a 61 y.o. male who presents as a referral from Dr. Reuben Francis for neurosurgical evaluation of chronic lower back pain and leg pain. He has established care with neurology due to chronic neuropathic pain in his feet and has been found to have a severe polyneuropathy on EMG/NCS. He has also recently been diagnosed with diabetes. He is a retired  and also endorses a history of worsening lower back pain and leg pain. He describes pain in his lower back that is worsened by activity. He also describes severe radicular pain in his left leg. The pain will radiate from his back into his buttock and posterior thigh to the ankle. He also describes severe left leg pain with walking. He states his leg pain is somewhat improved when he leans forward or walks with a cart. He has attempted physical therapy for his back pain and states that made him worse. He has not seen pain management or attempted any injections. He is currently taking norco and gabapentin for his pain. Of note, he is a smoker and has recently cut back from 2 packs daily to ~3/4 of a pack per day. He is on a 325mg ASA daily. MEDICAL HISTORY:             No past medical history on file.     Past Surgical History:   Procedure Laterality Date    INGUINAL HERNIA REPAIR      MANDIBLE SURGERY      SHOULDER SURGERY Left          Current Outpatient Medications:     ibuprofen (ADVIL;MOTRIN) 800 MG tablet, ibuprofen 800 mg tablet, Disp: , Rfl:     ezetimibe (ZETIA) 10 MG tablet, Take 1 tablet by mouth daily, Disp: , Rfl:     DULoxetine (CYMBALTA) 60 MG extended release

## 2023-10-19 RX ORDER — HYDROCODONE BITARTRATE AND ACETAMINOPHEN 7.5; 325 MG/1; MG/1
1 TABLET ORAL EVERY 6 HOURS PRN
Qty: 30 TABLET | Refills: 0 | Status: SHIPPED | OUTPATIENT
Start: 2023-10-19 | End: 2023-11-18

## 2023-11-20 RX ORDER — DULOXETIN HYDROCHLORIDE 60 MG/1
60 CAPSULE, DELAYED RELEASE ORAL DAILY
Qty: 30 CAPSULE | Refills: 5 | Status: SHIPPED | OUTPATIENT
Start: 2023-11-20

## 2023-11-20 NOTE — TELEPHONE ENCOUNTER
Requested Prescriptions     Pending Prescriptions Disp Refills    DULoxetine (CYMBALTA) 60 MG extended release capsule [Pharmacy Med Name: DULoxetine HCl 60 MG Oral Capsule Delayed Release Particles] 30 capsule 0     Sig: Take 1 capsule by mouth once daily       Last Office Visit: 9/18/2023  Next Office Visit: 12/18/2023  Last Medication Refill: 6/15/2023 with 5 RF

## 2023-11-27 ENCOUNTER — TELEPHONE (OUTPATIENT)
Dept: VASCULAR SURGERY | Age: 63
End: 2023-11-27

## 2023-11-27 NOTE — TELEPHONE ENCOUNTER
----- Message from SHONA Hayes sent at 11/24/2023  7:24 AM CST -----  Can we call him and see if he wants follow up.   Needed cta neck months ago

## 2023-12-06 DIAGNOSIS — G62.9 NEUROPATHY: ICD-10-CM

## 2023-12-06 RX ORDER — HYDROCODONE BITARTRATE AND ACETAMINOPHEN 7.5; 325 MG/1; MG/1
1 TABLET ORAL EVERY 6 HOURS PRN
Qty: 30 TABLET | Refills: 0 | Status: SHIPPED | OUTPATIENT
Start: 2023-12-06 | End: 2024-01-05

## 2023-12-06 NOTE — TELEPHONE ENCOUNTER
Requested Prescriptions     Pending Prescriptions Disp Refills    HYDROcodone-acetaminophen (NORCO) 7.5-325 MG per tablet 30 tablet 0     Sig: Take 1 tablet by mouth every 6 hours as needed for Pain for up to 30 days.  Take 1 each pm as needed       Last Office Visit:  9/18/2023  Next Office Visit:  12/18/2023  Last Medication Refill:  10/19/2023 with 0 RENETTA Zacarias up to date:  9/11/2023    *RX updated to reflect   12/6/2023  fill date*      Dr. Treva Pena patient

## 2024-01-08 DIAGNOSIS — G62.9 NEUROPATHY: ICD-10-CM

## 2024-01-08 RX ORDER — HYDROCODONE BITARTRATE AND ACETAMINOPHEN 7.5; 325 MG/1; MG/1
1 TABLET ORAL EVERY 6 HOURS PRN
Qty: 30 TABLET | Refills: 0 | Status: SHIPPED | OUTPATIENT
Start: 2024-01-08 | End: 2024-02-07

## 2024-01-08 NOTE — TELEPHONE ENCOUNTER
Requested Prescriptions     Pending Prescriptions Disp Refills    HYDROcodone-acetaminophen (NORCO) 7.5-325 MG per tablet 30 tablet 0     Sig: Take 1 tablet by mouth every 6 hours as needed for Pain for up to 30 days. Take 1 each pm as needed       Last Office Visit:  12/18/2023  Next Office Visit:  none  Last Medication Refill:  12/6/23  Dave up to date:  1/8/24    *RX updated to reflect   1/8/24  fill date*      Pt called and states Dr. Bianchi told him he would only prescribe Norco until pt is able to get into pain management. He is asking for one more refill since he is not scheduled to see pain management until Jan. 23, 24. Are you ok to send a new script? Please refuse if not.

## 2024-01-16 ENCOUNTER — TELEPHONE (OUTPATIENT)
Dept: NEUROLOGY | Age: 64
End: 2024-01-16

## 2024-01-16 NOTE — TELEPHONE ENCOUNTER
Pt called asking about his refill request he submitted to us a week ago for Hydrocodone. I looked into his chart and informed him it was sent to [facility] on 1/8/24. He thanked me and said he would contact the pharmacy. Approx. 10 minutes later I answered a call from the same [facility] and the tech informed me when she ran the medication through insurance it was denied and said it needed a PA for max opiate duration exceeded. She said it gave a phone number to call to give more information. She thinks the sig is what is causing it to ask for more information since the pt only takes one daily as needed for pain. I called the number she gave me to get more information and was told they only needed to know how long the pt will be on the medication and why. I told him he has an appt with pain management on the 26th of this month and the pt will only need the medication for one month supply from our office just to get him by until his appt with PM. The man states understanding and will let us know within 24 hours if the pt can fill the medication or not. I thanked him.

## 2024-01-17 NOTE — TELEPHONE ENCOUNTER
Pt called to check on status of PA. I told him I would call Medicare and call him back. I spoke with Medicare and was informed the PA was denied. I called the pt back and informed him of this and he states he is just going to do cash pay for the Hydrocodone. I told him I could sign him up to use a GoodRX card to save him some money at a different pharmacy but he stated that [facility] is the only pharmacy in his town. [facility] does not accept GoodRX for controlled meds. Pt thanked me for helping him.

## 2024-01-23 ENCOUNTER — HOSPITAL ENCOUNTER (OUTPATIENT)
Dept: PAIN MANAGEMENT | Age: 64
Discharge: HOME | End: 2024-01-23
Payer: MEDICAID

## 2024-01-23 VITALS
RESPIRATION RATE: 18 BRPM | OXYGEN SATURATION: 94 % | HEART RATE: 102 BPM | HEIGHT: 66 IN | BODY MASS INDEX: 33.27 KG/M2 | SYSTOLIC BLOOD PRESSURE: 151 MMHG | WEIGHT: 207 LBS | TEMPERATURE: 97.7 F | DIASTOLIC BLOOD PRESSURE: 85 MMHG

## 2024-01-23 PROCEDURE — 99215 OFFICE O/P EST HI 40 MIN: CPT

## 2024-01-23 RX ORDER — ASPIRIN 81 MG/1
81 TABLET ORAL DAILY
COMMUNITY

## 2024-01-23 ASSESSMENT — PAIN SCALES - GENERAL: PAINLEVEL_OUTOF10: 9

## 2024-01-23 ASSESSMENT — PAIN DESCRIPTION - LOCATION: LOCATION: BACK

## 2024-01-23 ASSESSMENT — PAIN DESCRIPTION - PAIN TYPE: TYPE: CHRONIC PAIN

## 2024-01-23 ASSESSMENT — PAIN DESCRIPTION - ORIENTATION: ORIENTATION: LOWER

## 2024-01-23 NOTE — PROGRESS NOTES
Clinic Documentation      Education Provided:  [x] Review of Dave  [x] Agreement Review  [x] PEG Score Calculated [x] PHQ Score Calculated [x] ORT Score Calculated    [] Compliance Issues Discussed [] Cognitive Behavior Needs [x] Exercise [] Review of Test [] Financial Issues  [x] Tobacco/Alcohol Use Reviewed [x] Teaching [x] New Patient [x] Picture Obtained    Physician Plan:  [] Outgoing Referral  [] Pharmacy Consult  [] Test Ordered [] Prescription Ordered/Changed   [] Obtained Test Results / Consult Notes        Complete if patient is withholding blood thinner for procedure     Blood Thinner Patient is currently taking:      [] Plavix (Hold for 7 days)  [x] Aspirin (Hold for 5 days)     [] Pletal (Hold for 2 days)  [] Pradaxa (Hold for 3 days)    [] Effient (Hold for 7 days)  [] Xarelto (Hold for 2 days)    [] Eliquis (Hold for 2 days)  [] Brilinta (Hold for 7 days)    [] Coumadin (Hold for 5 days) - (INR needs to be drawn the day prior to procedure- INR < 2.0)    [] Aggrenox (Hold for 7 days)        [] Patient will stop medication on their own.    [] Blood Thinner Form Faxed for approval to hold.   Provider form faxed to:     Assessment Completed by:  Electronically signed by Ana Benson RN on 1/23/2024 at 3:18 PM  
Office agreement reviewed with patient and signed per patient. Questions answered and patient states understanding of agreement. Copy given to patient. Also gave patient a copy of office phone number options.  
as above
/72
/81
Patient is c/o RLE pain.

## 2024-01-23 NOTE — DISCHARGE INSTRUCTIONS
Treatment: The treatment goal of pain management is to find the cause behind the pain and treat the causes. To find the cause, a plan is developed which could included, but not limited to, diagnostic testing, imaging, physical therapy, possibly injections. Once the cause has been determined, a treatment plan is developed to treat the source of the pain. This could include, but not limited to, chiropractics, physical therapy, massage therapy, dry needling, medical equipment, cognitive behavioral therapy, injections, surgical referrals, and medications. The plan is to treat what is causing the pain with conservative alternate treatments. Masking the pain with opioids and other medications is not an effective treatment but a temporary fix. Opioids are not given to every patient in pain management. Actually, opioids are avoided if possible. If opioid medications were given, the goal is to use the least amount of opioids as possible while tailoring a more effective treatment plan to treat the cause of your pain which will help reduce and possibly eliminate your pain. If opioids were given the goal is to wean medication as soon as appropriate. If your pain is proven to be chronic pain that continuous around the clock pain medication is required, the use of abuse deterrent medication will be used as directed by the opioids drug task force.     Exercise - Relying solely on medications and injections will not alleviate your pain or keep your pain at a tolerable level. Core strengthening coupled with a generalized exercise program has been proven to help function and reduce pain. Therefore, following a regular generalized exercise program is vital in helping to reduce your pain. Please follow these exercise instructions as given in your discharge instructions.     Gualberto Goodson, Ashley Villegas, [...], and Berry Scott; (2012) A [place]-Analysis of Core Stability Exercise versus General Exercise for Chronic Low Back Pain; PloS

## 2024-01-23 NOTE — H&P
No JVD.      Trachea: No tracheal deviation.   Cardiovascular:      Rate and Rhythm: Normal rate.   Pulmonary:      Effort: Pulmonary effort is normal.   Abdominal:      General: There is no distension.      Tenderness: There is no abdominal tenderness.   Musculoskeletal:      Comments: Positive Lasegue Maneuver bilaterally    Positive  for pain elicited with flex and extension     Patient points to on left > Right SI joint as the source of low back pain  TTP of SI joint on left  positive Pablo's bilaterally, positive Distraction bilaterally, positive Thigh Thrust bilaterally    Was not piriformis muscle is taut - rope like - with palpable tender knots identified - Trigger points    Yes TTP for troch bursa on left      Yes TTP for ischial bursa on left      Skin:     General: Skin is warm and dry.   Neurological:      Mental Status: He is alert and oriented to person, place, and time.      Cranial Nerves: No cranial nerve deficit.   Psychiatric:         Mood and Affect: Mood normal.         Behavior: Behavior normal.         Thought Content: Thought content normal.         Judgment: Judgment normal.         Labs:     No results found for: \"NA\", \"K\", \"CL\", \"CO2\", \"BUN\", \"CREATININE\", \"GLUCOSE\", \"CALCIUM\"     No results found for: \"WBC\", \"HGB\", \"HCT\", \"MCV\", \"PLT\"    Assessment:                                                                                                                                        Active Problems:    * No active hospital problems. *  Resolved Problems:    * No resolved hospital problems. *      PLAN:    Epidural steroid injection Lumbar Region- injection (shot) of a small amount of steroid medication given near a specific nerve or group of nerves to relieve pain. A lumbar epidural steroid injection is used to treat lower back problems.     There are no diagnoses linked to this encounter.     Patient given HEP (home exercise program) specific to patient's diagnosis and condition given.

## 2024-01-25 DIAGNOSIS — G62.9 NEUROPATHY: ICD-10-CM

## 2024-01-26 RX ORDER — HYDROCODONE BITARTRATE AND ACETAMINOPHEN 7.5; 325 MG/1; MG/1
1 TABLET ORAL AS NEEDED
Qty: 30 TABLET | Refills: 0 | Status: SHIPPED | OUTPATIENT
Start: 2024-01-26 | End: 2024-02-25

## 2024-01-29 ENCOUNTER — OFFICE VISIT (OUTPATIENT)
Dept: NEUROSURGERY | Age: 64
End: 2024-01-29
Payer: MEDICAID

## 2024-01-29 ENCOUNTER — HOSPITAL ENCOUNTER (OUTPATIENT)
Dept: GENERAL RADIOLOGY | Age: 64
Discharge: HOME | End: 2024-01-29
Payer: MEDICAID

## 2024-01-29 VITALS
BODY MASS INDEX: 33.27 KG/M2 | WEIGHT: 207.01 LBS | HEIGHT: 66 IN | HEART RATE: 80 BPM | DIASTOLIC BLOOD PRESSURE: 78 MMHG | SYSTOLIC BLOOD PRESSURE: 128 MMHG

## 2024-01-29 DIAGNOSIS — M43.17 SPONDYLOLISTHESIS AT L5-S1 LEVEL: Primary | ICD-10-CM

## 2024-01-29 DIAGNOSIS — M48.062 SPINAL STENOSIS OF LUMBAR REGION WITH NEUROGENIC CLAUDICATION: ICD-10-CM

## 2024-01-29 DIAGNOSIS — M51.36 DDD (DEGENERATIVE DISC DISEASE), LUMBAR: ICD-10-CM

## 2024-01-29 DIAGNOSIS — M54.16 LUMBAR RADICULOPATHY: ICD-10-CM

## 2024-01-29 DIAGNOSIS — Z72.0 TOBACCO ABUSE: ICD-10-CM

## 2024-01-29 DIAGNOSIS — M43.17 SPONDYLOLISTHESIS AT L5-S1 LEVEL: ICD-10-CM

## 2024-01-29 DIAGNOSIS — G57.93 NEUROPATHIC PAIN OF BOTH FEET: ICD-10-CM

## 2024-01-29 PROCEDURE — 72110 X-RAY EXAM L-2 SPINE 4/>VWS: CPT

## 2024-01-29 PROCEDURE — 99213 OFFICE O/P EST LOW 20 MIN: CPT | Performed by: NEUROLOGICAL SURGERY

## 2024-01-29 ASSESSMENT — ENCOUNTER SYMPTOMS
BACK PAIN: 1
RESPIRATORY NEGATIVE: 1
GASTROINTESTINAL NEGATIVE: 1
EYES NEGATIVE: 1

## 2024-01-29 NOTE — PROGRESS NOTES
NEUROSURGERY FOLLOW UP      Chief Complaint:   Chief Complaint   Patient presents with    Follow-up     Patient is here after establishing care with PM, has not had injection yet. He states that his back pain is worse than last visit.     Results     XR 1/29/24         Interval Update:    Patient presents for planned follow-up.  He continues his efforts to quit smoking and is now down to 1/2 of a pack of cigarettes per day.  He has seen pain management and is scheduled for an KEHINDE in the near future.  He continues to endorse lower back pain and left lower extremity radicular pain as well as symptoms consistent with neurogenic claudication.  He also continues to experience significant neuropathic pain in his feet.      HPI:     The patient is a 63 y.o. male who presents as a referral from Dr. Bianchi for neurosurgical evaluation of chronic lower back pain and leg pain.  He has established care with neurology due to chronic neuropathic pain in his feet and has been found to have a severe polyneuropathy on EMG/NCS.  He has also recently been diagnosed with diabetes.  He is a retired  and also endorses a history of worsening lower back pain and leg pain.  He describes pain in his lower back that is worsened by activity.  He also describes severe radicular pain in his left leg.  The pain will radiate from his back into his buttock and posterior thigh to the ankle.  He also describes severe left leg pain with walking.  He states his leg pain is somewhat improved when he leans forward or walks with a cart.  He has attempted physical therapy for his back pain and states that made him worse.  He has not seen pain management or attempted any injections.  He is currently taking norco and gabapentin for his pain.  Of note, he is a smoker and has recently cut back from 2 packs daily to ~3/4 of a pack per day.  He is on a 325mg ASA daily.     ROS:    Constitutional: Negative.    HENT: Negative.     Eyes: Negative.

## 2024-02-07 ENCOUNTER — TELEPHONE (OUTPATIENT)
Dept: PAIN MANAGEMENT | Age: 64
End: 2024-02-07

## 2024-02-07 NOTE — TELEPHONE ENCOUNTER
Call placed to patient to remind him to hold asa x5 starting tomorrow for LESI next Tuesday. Voicemail left for patient.

## 2024-02-13 ENCOUNTER — HOSPITAL ENCOUNTER (OUTPATIENT)
Dept: PAIN MANAGEMENT | Age: 64
Discharge: HOME | End: 2024-02-13
Payer: MEDICAID

## 2024-02-13 VITALS
TEMPERATURE: 96.8 F | OXYGEN SATURATION: 97 % | SYSTOLIC BLOOD PRESSURE: 149 MMHG | DIASTOLIC BLOOD PRESSURE: 83 MMHG | HEART RATE: 104 BPM | RESPIRATION RATE: 18 BRPM

## 2024-02-13 DIAGNOSIS — R52 PAIN MANAGEMENT: ICD-10-CM

## 2024-02-13 PROCEDURE — 2500000003 HC RX 250 WO HCPCS

## 2024-02-13 PROCEDURE — 2580000003 HC RX 258

## 2024-02-13 PROCEDURE — A4216 STERILE WATER/SALINE, 10 ML: HCPCS

## 2024-02-13 PROCEDURE — 62323 NJX INTERLAMINAR LMBR/SAC: CPT

## 2024-02-13 PROCEDURE — 6360000002 HC RX W HCPCS

## 2024-02-13 RX ORDER — SODIUM CHLORIDE 9 MG/ML
5 INJECTION INTRAVENOUS ONCE
Status: DISCONTINUED | OUTPATIENT
Start: 2024-02-13 | End: 2024-02-15 | Stop reason: HOSPADM

## 2024-02-13 RX ORDER — LIDOCAINE HYDROCHLORIDE 10 MG/ML
5 INJECTION, SOLUTION EPIDURAL; INFILTRATION; INTRACAUDAL; PERINEURAL ONCE
Status: DISCONTINUED | OUTPATIENT
Start: 2024-02-13 | End: 2024-02-15 | Stop reason: HOSPADM

## 2024-02-13 RX ORDER — METHYLPREDNISOLONE ACETATE 80 MG/ML
80 INJECTION, SUSPENSION INTRA-ARTICULAR; INTRALESIONAL; INTRAMUSCULAR; SOFT TISSUE ONCE
Status: DISCONTINUED | OUTPATIENT
Start: 2024-02-13 | End: 2024-02-15 | Stop reason: HOSPADM

## 2024-02-13 NOTE — PROGRESS NOTES
Procedure:  Level of Consciousness: [x]Alert [x]Oriented []Disoriented []Lethargic  Anxiety Level: [x]Calm []Anxious []Depressed []Other  Skin: []Warm [x]Dry []Cool []Moist []Intact []Other  Cardiovascular: [x]Palpitations: []Never []Occasionally []Frequently  Chest Pain: [x]No []Yes  Respiratory:  [x]Unlabored []Labored []Cough ([] Productive []Unproductive)  HCG Required: [x]No []Yes   Results: []Negative []Positive  Knowledge Level:        [x]Patient/Other verbalized understanding of pre-procedure instructions.        [x]Assessment of post-op care needs (transportation, responsible caregiver)        [x]Able to discuss health care problems and how to deal with it.  Factors that Affect Teaching:        Language Barrier: [x]No []Yes - why:        Hearing Loss:        [x]No []Yes            Corrective Device:  []Yes []No        Vision Loss:           [x]No []Yes            Corrective Device:  []Yes []No        Memory Loss:       [x]No []Yes            []Short Term []Long Term  Motivational Level:  [x]Asks Questions                  []Extremely Anxious       [x]Seems Interested               []Seems Uninterested                  [x]Denies need for Education  Risk for Injury:  [x]Patient oriented to person, place and time  []History of frequent falls/loss of balance  Nutritional:  []Change in appetite   []Weight Gain   []Weight Loss  Functional:  []Requires assistance with ADL's

## 2024-02-13 NOTE — INTERVAL H&P NOTE
Update History & Physical    The patient's History and Physical  was reviewed with the patient and I examined the patient. There was no change. The surgical site was confirmed by the patient and me.     Plan: The risks, benefits, expected outcome, and alternative to the recommended procedure have been discussed with the patient. Patient understands and wants to proceed with the procedure.     Electronically signed by Conrad Warren MD on 2/13/2024 at 12:20 PM

## 2024-02-19 ENCOUNTER — TELEPHONE (OUTPATIENT)
Dept: PAIN MANAGEMENT | Age: 64
End: 2024-02-19

## 2024-02-22 PROBLEM — M48.062 SPINAL STENOSIS OF LUMBAR REGION WITH NEUROGENIC CLAUDICATION: Status: ACTIVE | Noted: 2024-02-22

## 2024-02-22 PROBLEM — G89.29 CHRONIC LEFT-SIDED LOW BACK PAIN WITH LEFT-SIDED SCIATICA: Status: ACTIVE | Noted: 2024-02-22

## 2024-02-22 PROBLEM — M54.42 CHRONIC LEFT-SIDED LOW BACK PAIN WITH LEFT-SIDED SCIATICA: Status: ACTIVE | Noted: 2024-02-22

## 2024-02-22 ASSESSMENT — ENCOUNTER SYMPTOMS
CONSTIPATION: 0
BACK PAIN: 1

## 2024-03-13 DIAGNOSIS — G62.9 NEUROPATHY: ICD-10-CM

## 2024-03-13 NOTE — TELEPHONE ENCOUNTER
Requested Prescriptions     Pending Prescriptions Disp Refills    HYDROcodone-acetaminophen (NORCO) 7.5-325 MG per tablet 30 tablet 0     Sig: Take 1 tablet by mouth as needed for Pain for up to 30 days. Must last 30 days.       Last Office Visit:  12/18/2023  Next Office Visit:  4/1/2024  Last Medication Refill:  1/26/24   Dave up to date:  3/13/24    *RX updated to reflect   3/13/24  fill date*

## 2024-03-14 RX ORDER — HYDROCODONE BITARTRATE AND ACETAMINOPHEN 7.5; 325 MG/1; MG/1
1 TABLET ORAL AS NEEDED
Qty: 30 TABLET | Refills: 0 | Status: SHIPPED | OUTPATIENT
Start: 2024-03-14 | End: 2024-04-13

## 2024-04-01 ENCOUNTER — OFFICE VISIT (OUTPATIENT)
Dept: NEUROLOGY | Age: 64
End: 2024-04-01
Payer: MEDICAID

## 2024-04-01 VITALS
BODY MASS INDEX: 33.27 KG/M2 | SYSTOLIC BLOOD PRESSURE: 139 MMHG | WEIGHT: 207 LBS | DIASTOLIC BLOOD PRESSURE: 78 MMHG | HEIGHT: 66 IN | HEART RATE: 97 BPM

## 2024-04-01 DIAGNOSIS — G62.9 NEUROPATHY: Primary | ICD-10-CM

## 2024-04-01 DIAGNOSIS — M48.062 SPINAL STENOSIS OF LUMBAR REGION WITH NEUROGENIC CLAUDICATION: ICD-10-CM

## 2024-04-01 DIAGNOSIS — Z86.39 HISTORY OF DIABETES MELLITUS: ICD-10-CM

## 2024-04-01 PROCEDURE — 99213 OFFICE O/P EST LOW 20 MIN: CPT | Performed by: PSYCHIATRY & NEUROLOGY

## 2024-04-01 RX ORDER — FENOFIBRATE 50 MG/1
25 CAPSULE ORAL DAILY
COMMUNITY

## 2024-04-01 NOTE — PROGRESS NOTES
REVIEW OF SYSTEMS    Constitutional: []Fever []Sweat []Chills [] Recent Injury [x] Denies all unless marked  HEENT:[]Headache  [] Head Injury/Hearing Loss  [] Sore Throat  [] Ear Ache/Dizziness  [x] Denies all unless marked  Spine:  [] Neck pain  [] Back pain  [] Sciaticia  [x] Denies all unless marked  Cardiovascular:[]Heart Disease []Chest Pain [] Palpitations  [x] Denies all unless marked  Pulmonary: []Shortness of Breath []Cough   [x] Denies all unless marke  Gastrointestinal: []Nausea  []Vomiting  []Abdominal Pain  []Constipation  []Diarrhea  []Dark Bloody Stools  [x] Denies all unless marked  Psychiatric/Behavioral:[] Depression [] Anxiety [x] Denies all unless marked  Genitourinary:   [] Frequency  [] Urgency  [] Incontinence [] Pain with Urination  [x] Denies all unless marked  Extremities: [x]Pain  []Swelling  [x] Denies all unless marked  Musculoskeletal: [] Muscle Pain  [] Joint Pain  [] Arthritis [x] Muscle Cramps [] Muscle Twitches  [x] Denies all unless marked  Sleep: [] Insomnia [] Snoring [] Restless Legs [] Sleep Apnea  [] Daytime Sleepiness  [x] Denies all unless marked  Skin:[] Rash [] Skin Discoloration [x] Denies all unless marked   Neurological: []Visual Disturbance/Memory Loss [] Loss of Balance [] Slurred Speech/Weakness [] Seizures  [] Vertigo/Dizziness [x] Denies all unless marked       
[] Neck pain  [] Back pain  [] Sciaticia  [x] Denies all unless marked  Cardiovascular:[]Heart Disease []Chest Pain [] Palpitations  [x] Denies all unless marked  Pulmonary: []Shortness of Breath []Cough   [x] Denies all unless marke  Gastrointestinal: []Nausea  []Vomiting  []Abdominal Pain  []Constipation  []Diarrhea  []Dark Bloody Stools  [x] Denies all unless marked  Psychiatric/Behavioral:[] Depression [] Anxiety [x] Denies all unless marked  Genitourinary:   [] Frequency  [] Urgency  [] Incontinence [] Pain with Urination  [x] Denies all unless marked  Extremities: [x]Pain  []Swelling  [x] Denies all unless marked  Musculoskeletal: [] Muscle Pain  [] Joint Pain  [] Arthritis [x] Muscle Cramps [] Muscle Twitches  [x] Denies all unless marked  Sleep: [] Insomnia [] Snoring [] Restless Legs [] Sleep Apnea  [] Daytime Sleepiness  [x] Denies all unless marked  Skin:[] Rash [] Skin Discoloration [x] Denies all unless marked   Neurological: []Visual Disturbance/Memory Loss [] Loss of Balance [] Slurred Speech/Weakness [] Seizures  [] Vertigo/Dizziness [x] Denies all unless marked              Current Outpatient Medications   Medication Sig Dispense Refill    fenofibrate (LIPOFEN) 50 MG CAPS capsule Take 25 mg by mouth daily      HYDROcodone-acetaminophen (NORCO) 7.5-325 MG per tablet Take 1 tablet by mouth as needed for Pain for up to 30 days. Must last 30 days. 30 tablet 0    aspirin 81 MG EC tablet Take 1 tablet by mouth daily      DULoxetine (CYMBALTA) 60 MG extended release capsule Take 1 capsule by mouth once daily 30 capsule 5    gabapentin (NEURONTIN) 300 MG capsule TAKE 2 CAPSULES BY MOUTH THREE TIMES DAILY 180 capsule 2    Cholecalciferol (VITAMIN D3) 25 MCG TABS Take 1 tablet by mouth daily      cyanocobalamin 1000 MCG tablet Take 1 tablet by mouth daily      carvedilol (COREG) 25 MG tablet TAKE 1 TABLET BY MOUTH TWICE DAILY DOSE INCREASE FROM 12.5MG TO 25MG      metFORMIN (GLUCOPHAGE) 1000 MG tablet TAKE 1

## 2024-04-23 ENCOUNTER — HOSPITAL ENCOUNTER (OUTPATIENT)
Dept: PAIN MANAGEMENT | Age: 64
Discharge: HOME | End: 2024-04-23
Payer: MEDICAID

## 2024-04-23 VITALS
HEIGHT: 66 IN | BODY MASS INDEX: 33.43 KG/M2 | WEIGHT: 208 LBS | HEART RATE: 107 BPM | RESPIRATION RATE: 18 BRPM | TEMPERATURE: 96.9 F | DIASTOLIC BLOOD PRESSURE: 86 MMHG | SYSTOLIC BLOOD PRESSURE: 159 MMHG | OXYGEN SATURATION: 92 %

## 2024-04-23 DIAGNOSIS — E11.40 PAINFUL DIABETIC NEUROPATHY (HCC): Primary | ICD-10-CM

## 2024-04-23 PROCEDURE — 99213 OFFICE O/P EST LOW 20 MIN: CPT

## 2024-04-23 PROCEDURE — 99214 OFFICE O/P EST MOD 30 MIN: CPT | Performed by: NURSE PRACTITIONER

## 2024-04-23 ASSESSMENT — PAIN DESCRIPTION - PAIN TYPE: TYPE: CHRONIC PAIN

## 2024-04-23 ASSESSMENT — ENCOUNTER SYMPTOMS
BACK PAIN: 1
CONSTIPATION: 0

## 2024-04-23 ASSESSMENT — PAIN SCALES - GENERAL: PAINLEVEL_OUTOF10: 7

## 2024-04-23 ASSESSMENT — PAIN DESCRIPTION - ORIENTATION: ORIENTATION: LOWER

## 2024-04-23 ASSESSMENT — PAIN DESCRIPTION - LOCATION: LOCATION: BACK

## 2024-04-23 NOTE — PROGRESS NOTES
Clinic Documentation      Education Provided:  [x] Review of Dave  [] Agreement Review  [x] PEG Score Calculated [] PHQ Score Calculated [] ORT Score Calculated    [] Compliance Issues Discussed [] Cognitive Behavior Needs [x] Exercise [] Review of Test [] Financial Issues  [x] Tobacco/Alcohol Use Reviewed [x] Teaching [] New Patient [] Picture Obtained    Physician Plan:  [] Outgoing Referral  [] Pharmacy Consult  [] Test Ordered [] Prescription Ordered/Changed   [] Obtained Test Results / Consult Notes        Complete if patient is withholding blood thinner for procedure     Blood Thinner Patient is currently taking:      [] Plavix (Hold for 7 days)  [x] Aspirin (Hold for 5 days)     [] Pletal (Hold for 2 days)  [] Pradaxa (Hold for 3 days)    [] Effient (Hold for 7 days)  [] Xarelto (Hold for 2 days)    [] Eliquis (Hold for 2 days)  [] Brilinta (Hold for 7 days)    [] Coumadin (Hold for 5 days) - (INR needs to be drawn the day prior to procedure- INR < 2.0)    [] Aggrenox (Hold for 7 days)        [x] Patient will stop medication on their own.    [] Blood Thinner Form Faxed for approval to hold.   Provider form faxed to:     Assessment Completed by:  Electronically signed by Ana Benson RN on 4/23/2024 at 11:53 AM  
psychological evaluation.     Once patient has diagnostic and laboratory clearance, the trial will be completed under MAC sedation.  The patient will go home for several days with temporary dorsal column stimulator.  Patient will need to have at least a 50% of pain relief to consider permanent placement of dorsal column stimulator.    Potential complication and risks include lead migration, spinal epidural hematoma, spinal cord trauma and/or epidural abscess.    The second step to DCS placement is If patient has at least 50% of pain relief with temporary placement, patient will be sent to surgeon of choice for permanent placement of dorsal column stimulator.    Above information was obtained from Up To Date    The patient has been explained of the above information. The patient understands the risk vs benefits and wants to proceed with DCS trail for the treatment of Spinal stenosis of lumbar region with neurogenic claudication, Chronic left-sided low back pain with left-sided sciatica &  Painful diabetic neuropathy (HCC)  At this point, patient wants to continue with LESI's for 2 more time. He wants see how effective the LESI becomes to treat his pain. After his 3rd LESI, he will decide if he wants to pursue DCS placement. AT that time, the  will be notified to start the DCS trial process, the manager will ensure the following orders are taken care of:    If patient has not had an MRI of thoracic and lumbar spine in the last year. Orders are to be placed without contrast if patient has never had previous back surgery and with/without contrast if patient has previous spinal surgery. If patient is unable to have MRI due to contraindications of MRI, Patient will have a myelogram of the thoracic and/or lumbar spine.    Order to be placed for neuro psych evaluation   Order to be placed for CBC, CMP, PT, PTT, INR, Chest Xray and UA   Once the above orders have been completed, approval will be obtained for

## 2024-05-02 DIAGNOSIS — G62.9 NEUROPATHY: ICD-10-CM

## 2024-05-02 NOTE — TELEPHONE ENCOUNTER
Navid Quiroz has requested a refill on his medication.      Last office visit : 4/1/2024   Next office visit : 7/1/2024   Last medication refill : 3/14/24  Dave : up to date       Requested Prescriptions     Pending Prescriptions Disp Refills    HYDROcodone-acetaminophen (NORCO) 7.5-325 MG per tablet 30 tablet 0     Sig: Take 1 tablet by mouth as needed for Pain for up to 30 days. Must last 30 days.

## 2024-05-03 RX ORDER — HYDROCODONE BITARTRATE AND ACETAMINOPHEN 7.5; 325 MG/1; MG/1
1 TABLET ORAL AS NEEDED
Qty: 30 TABLET | Refills: 0 | Status: SHIPPED | OUTPATIENT
Start: 2024-05-03 | End: 2024-06-02

## 2024-05-15 ENCOUNTER — TELEPHONE (OUTPATIENT)
Dept: PAIN MANAGEMENT | Age: 64
End: 2024-05-15

## 2024-05-15 NOTE — TELEPHONE ENCOUNTER
Call placed to patient to remind him to hold his asa x5 days starting tomorrow for LESI next Tuesday. Patient states understanding.

## 2024-05-21 ENCOUNTER — HOSPITAL ENCOUNTER (OUTPATIENT)
Dept: PAIN MANAGEMENT | Age: 64
Discharge: HOME | End: 2024-05-21
Payer: MEDICAID

## 2024-05-21 VITALS
RESPIRATION RATE: 18 BRPM | HEART RATE: 95 BPM | TEMPERATURE: 96.3 F | SYSTOLIC BLOOD PRESSURE: 146 MMHG | DIASTOLIC BLOOD PRESSURE: 83 MMHG | OXYGEN SATURATION: 95 %

## 2024-05-21 DIAGNOSIS — R52 PAIN MANAGEMENT: ICD-10-CM

## 2024-05-21 PROCEDURE — A4216 STERILE WATER/SALINE, 10 ML: HCPCS

## 2024-05-21 PROCEDURE — 2580000003 HC RX 258

## 2024-05-21 PROCEDURE — 62323 NJX INTERLAMINAR LMBR/SAC: CPT

## 2024-05-21 PROCEDURE — 2500000003 HC RX 250 WO HCPCS

## 2024-05-21 PROCEDURE — 6360000002 HC RX W HCPCS

## 2024-05-21 RX ORDER — LIDOCAINE HYDROCHLORIDE 10 MG/ML
5 INJECTION, SOLUTION EPIDURAL; INFILTRATION; INTRACAUDAL; PERINEURAL ONCE
Status: DISCONTINUED | OUTPATIENT
Start: 2024-05-21 | End: 2024-05-23 | Stop reason: HOSPADM

## 2024-05-21 RX ORDER — SODIUM CHLORIDE 9 MG/ML
5 INJECTION, SOLUTION INTRAMUSCULAR; INTRAVENOUS; SUBCUTANEOUS ONCE
Status: DISCONTINUED | OUTPATIENT
Start: 2024-05-21 | End: 2024-05-23 | Stop reason: HOSPADM

## 2024-05-21 RX ORDER — DULOXETIN HYDROCHLORIDE 60 MG/1
60 CAPSULE, DELAYED RELEASE ORAL DAILY
Qty: 90 CAPSULE | Refills: 3 | Status: SHIPPED | OUTPATIENT
Start: 2024-05-21

## 2024-05-21 RX ORDER — METHYLPREDNISOLONE ACETATE 80 MG/ML
80 INJECTION, SUSPENSION INTRA-ARTICULAR; INTRALESIONAL; INTRAMUSCULAR; SOFT TISSUE ONCE
Status: DISCONTINUED | OUTPATIENT
Start: 2024-05-21 | End: 2024-05-23 | Stop reason: HOSPADM

## 2024-05-21 ASSESSMENT — PAIN - FUNCTIONAL ASSESSMENT: PAIN_FUNCTIONAL_ASSESSMENT: 0-10

## 2024-05-21 NOTE — INTERVAL H&P NOTE
Update History & Physical    The patient's History and Physical  was reviewed with the patient and I examined the patient. There was no change. The surgical site was confirmed by the patient and me.     Plan: The risks, benefits, expected outcome, and alternative to the recommended procedure have been discussed with the patient. Patient understands and wants to proceed with the procedure.     Electronically signed by Conrad Warren MD on 5/21/2024 at 10:59 AM

## 2024-05-21 NOTE — TELEPHONE ENCOUNTER
Requested Prescriptions     Pending Prescriptions Disp Refills    DULoxetine (CYMBALTA) 60 MG extended release capsule [Pharmacy Med Name: DULoxetine HCl 60 MG Oral Capsule Delayed Release Particles] 90 capsule 0     Sig: Take 1 capsule by mouth once daily       Last Office Visit: 4/1/2024  Next Office Visit: 7/1/2024  Last Medication Refill: 11/20/23 with 5 refills

## 2024-06-20 DIAGNOSIS — G62.9 NEUROPATHY: ICD-10-CM

## 2024-06-20 RX ORDER — HYDROCODONE BITARTRATE AND ACETAMINOPHEN 7.5; 325 MG/1; MG/1
1 TABLET ORAL AS NEEDED
Qty: 30 TABLET | Refills: 0 | Status: SHIPPED | OUTPATIENT
Start: 2024-06-20 | End: 2024-07-20

## 2024-06-20 NOTE — TELEPHONE ENCOUNTER
Requested Prescriptions     Pending Prescriptions Disp Refills    HYDROcodone-acetaminophen (NORCO) 7.5-325 MG per tablet 30 tablet 0     Sig: Take 1 tablet by mouth as needed for Pain for up to 30 days. Must last 30 days.       Last Office Visit:  4/1/2024  Next Office Visit:  7/1/2024  Last Medication Refill:  05/03/2024  Dave up to date:  up to date    *RX updated to reflect   06/20/2024  fill date*

## 2024-07-01 ENCOUNTER — OFFICE VISIT (OUTPATIENT)
Dept: NEUROLOGY | Age: 64
End: 2024-07-01
Payer: MEDICAID

## 2024-07-01 ENCOUNTER — OFFICE VISIT (OUTPATIENT)
Dept: NEUROSURGERY | Age: 64
End: 2024-07-01
Payer: MEDICAID

## 2024-07-01 VITALS
WEIGHT: 204 LBS | BODY MASS INDEX: 32.78 KG/M2 | HEART RATE: 95 BPM | DIASTOLIC BLOOD PRESSURE: 81 MMHG | SYSTOLIC BLOOD PRESSURE: 141 MMHG | HEIGHT: 66 IN

## 2024-07-01 VITALS
HEART RATE: 95 BPM | WEIGHT: 203.93 LBS | HEIGHT: 66 IN | DIASTOLIC BLOOD PRESSURE: 78 MMHG | BODY MASS INDEX: 32.77 KG/M2 | SYSTOLIC BLOOD PRESSURE: 140 MMHG

## 2024-07-01 DIAGNOSIS — M54.16 LUMBAR RADICULOPATHY: ICD-10-CM

## 2024-07-01 DIAGNOSIS — G57.93 NEUROPATHIC PAIN OF BOTH FEET: ICD-10-CM

## 2024-07-01 DIAGNOSIS — M48.062 SPINAL STENOSIS OF LUMBAR REGION WITH NEUROGENIC CLAUDICATION: ICD-10-CM

## 2024-07-01 DIAGNOSIS — Z72.0 TOBACCO ABUSE: ICD-10-CM

## 2024-07-01 DIAGNOSIS — M51.36 DDD (DEGENERATIVE DISC DISEASE), LUMBAR: ICD-10-CM

## 2024-07-01 DIAGNOSIS — G62.9 NEUROPATHY: Primary | ICD-10-CM

## 2024-07-01 DIAGNOSIS — Z86.39 HISTORY OF DIABETES MELLITUS: ICD-10-CM

## 2024-07-01 DIAGNOSIS — M43.17 SPONDYLOLISTHESIS AT L5-S1 LEVEL: Primary | ICD-10-CM

## 2024-07-01 PROCEDURE — 99213 OFFICE O/P EST LOW 20 MIN: CPT | Performed by: PSYCHIATRY & NEUROLOGY

## 2024-07-01 PROCEDURE — 99213 OFFICE O/P EST LOW 20 MIN: CPT | Performed by: NEUROLOGICAL SURGERY

## 2024-07-01 ASSESSMENT — ENCOUNTER SYMPTOMS
RESPIRATORY NEGATIVE: 1
EYES NEGATIVE: 1
BACK PAIN: 1
GASTROINTESTINAL NEGATIVE: 1
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
## 2024-07-01 NOTE — PROGRESS NOTES
NEUROSURGERY FOLLOW UP      Chief Complaint:   Chief Complaint   Patient presents with    Follow-up     Patient is here to follow up after PM injection and states that first injection helped for 5 weeks, second injection helped for 2 weeks.          Interval Update:    Patient presents for planned follow-up.  He continues his efforts to quit smoking.  He states he was able to go for six days without a cigarette but stress at home caused him to resume.  He continues to endorse lower back pain bilateral lower extremity radicular pain as well as symptoms consistent with neurogenic claudication.  He also continues to experience significant neuropathic pain in his feet.  He has seen Dr. Warren in pain management and attempted two epidural steroid injections, the first provided ~5 weeks of relief and the second ~2 weeks.      HPI:     The patient is a 63 y.o. male who presents as a referral from Dr. Bianchi for neurosurgical evaluation of chronic lower back pain and leg pain.  He has established care with neurology due to chronic neuropathic pain in his feet and has been found to have a severe polyneuropathy on EMG/NCS.  He has also recently been diagnosed with diabetes.  He is a retired  and also endorses a history of worsening lower back pain and leg pain.  He describes pain in his lower back that is worsened by activity.  He also describes severe radicular pain in his left leg.  The pain will radiate from his back into his buttock and posterior thigh to the ankle.  He also describes severe left leg pain with walking.  He states his leg pain is somewhat improved when he leans forward or walks with a cart.  He has attempted physical therapy for his back pain and states that made him worse.  He has not seen pain management or attempted any injections.  He is currently taking norco and gabapentin for his pain.  Of note, he is a smoker and has recently cut back from 2 packs daily to ~3/4 of a pack per day.  He

## 2024-07-29 DIAGNOSIS — G62.9 NEUROPATHY: ICD-10-CM

## 2024-07-29 RX ORDER — HYDROCODONE BITARTRATE AND ACETAMINOPHEN 7.5; 325 MG/1; MG/1
1 TABLET ORAL AS NEEDED
Qty: 30 TABLET | Refills: 0 | Status: SHIPPED | OUTPATIENT
Start: 2024-07-29 | End: 2024-08-28

## 2024-07-29 NOTE — TELEPHONE ENCOUNTER
Requested Prescriptions     Pending Prescriptions Disp Refills    HYDROcodone-acetaminophen (NORCO) 7.5-325 MG per tablet 30 tablet 0     Sig: Take 1 tablet by mouth as needed for Pain for up to 30 days. Must last 30 days.       Last Office Visit:  7/1/2024  Next Office Visit:  10/3/2024  Last Medication Refill:  6/20/2024 with 0 RENETTA Acosta up to date:  7/29/2024    *RX updated to reflect   7/29/2024  fill date*

## 2024-08-05 ENCOUNTER — HOSPITAL ENCOUNTER (OUTPATIENT)
Dept: PAIN MANAGEMENT | Age: 64
Discharge: HOME | End: 2024-08-05

## 2024-09-03 DIAGNOSIS — G62.9 NEUROPATHY: ICD-10-CM

## 2024-09-03 NOTE — TELEPHONE ENCOUNTER
Requested Prescriptions     Pending Prescriptions Disp Refills    HYDROcodone-acetaminophen (NORCO) 7.5-325 MG per tablet 30 tablet 0     Sig: Take 1 tablet by mouth as needed for Pain for up to 30 days. Must last 30 days.       Last Office Visit:  7/1/2024  Next Office Visit:  10/3/2024  Last Medication Refill:  7/29/2024 with 0 RENETTA Acosta up to date:  7/29/2024     *RX updated to reflect   9/3/2024  fill date*

## 2024-09-04 RX ORDER — HYDROCODONE BITARTRATE AND ACETAMINOPHEN 7.5; 325 MG/1; MG/1
1 TABLET ORAL AS NEEDED
Qty: 30 TABLET | Refills: 0 | Status: SHIPPED | OUTPATIENT
Start: 2024-09-04 | End: 2024-10-04

## 2024-10-03 ENCOUNTER — TELEPHONE (OUTPATIENT)
Dept: NEUROSURGERY | Age: 64
End: 2024-10-03

## 2024-10-03 ENCOUNTER — OFFICE VISIT (OUTPATIENT)
Dept: NEUROLOGY | Age: 64
End: 2024-10-03
Payer: MEDICAID

## 2024-10-03 VITALS
HEIGHT: 65 IN | SYSTOLIC BLOOD PRESSURE: 138 MMHG | OXYGEN SATURATION: 96 % | BODY MASS INDEX: 30.99 KG/M2 | RESPIRATION RATE: 16 BRPM | HEART RATE: 68 BPM | DIASTOLIC BLOOD PRESSURE: 90 MMHG | WEIGHT: 186 LBS

## 2024-10-03 DIAGNOSIS — G89.29 CHRONIC LEFT-SIDED LOW BACK PAIN WITH LEFT-SIDED SCIATICA: ICD-10-CM

## 2024-10-03 DIAGNOSIS — G62.9 NEUROPATHY: Primary | ICD-10-CM

## 2024-10-03 DIAGNOSIS — M48.062 SPINAL STENOSIS OF LUMBAR REGION WITH NEUROGENIC CLAUDICATION: ICD-10-CM

## 2024-10-03 DIAGNOSIS — M79.671 PAIN IN BOTH FEET: ICD-10-CM

## 2024-10-03 DIAGNOSIS — M79.672 PAIN IN BOTH FEET: ICD-10-CM

## 2024-10-03 DIAGNOSIS — M54.42 CHRONIC LEFT-SIDED LOW BACK PAIN WITH LEFT-SIDED SCIATICA: ICD-10-CM

## 2024-10-03 PROCEDURE — 99213 OFFICE O/P EST LOW 20 MIN: CPT | Performed by: PSYCHIATRY & NEUROLOGY

## 2024-10-03 RX ORDER — HYDROCODONE BITARTRATE AND ACETAMINOPHEN 7.5; 325 MG/1; MG/1
1 TABLET ORAL AS NEEDED
Qty: 30 TABLET | Refills: 0 | Status: SHIPPED | OUTPATIENT
Start: 2024-10-03 | End: 2024-11-02

## 2024-10-03 RX ORDER — EZETIMIBE 10 MG/1
10 TABLET ORAL DAILY
COMMUNITY
Start: 2024-07-26

## 2024-10-03 RX ORDER — FAMOTIDINE 20 MG/1
20 TABLET, FILM COATED ORAL 2 TIMES DAILY
COMMUNITY
Start: 2024-09-19

## 2024-10-03 NOTE — TELEPHONE ENCOUNTER
LVM for patient to call back about appt with Dr Marrero for 10/7. Appt is to discuss if he talked to PM about SCS trial. Appt to be r/s after trial.

## 2024-10-03 NOTE — PROGRESS NOTES
REVIEW OF SYSTEMS    Constitutional: []Fever []Sweat []Chills [] Recent Injury [x] Denies all unless marked  HEENT:[]Headache  [] Head Injury/Hearing Loss  [] Sore Throat  [] Ear Ache/Dizziness  [x] Denies all unless marked  Spine:  [] Neck pain  [] Back pain  [] Sciaticia  [x] Denies all unless marked  Cardiovascular:[]Heart Disease []Chest Pain [] Palpitations  [x] Denies all unless marked  Pulmonary: []Shortness of Breath []Cough   [x] Denies all unless marke  Gastrointestinal: []Nausea  []Vomiting  []Abdominal Pain  []Constipation  []Diarrhea  []Dark Bloody Stools  [x] Denies all unless marked  Psychiatric/Behavioral:[] Depression [] Anxiety [x] Denies all unless marked  Genitourinary:   [] Frequency  [] Urgency  [] Incontinence [] Pain with Urination  [x] Denies all unless marked  Extremities: []Pain  []Swelling  [x] Denies all unless marked  Musculoskeletal: [] Muscle Pain  [] Joint Pain  [] Arthritis [] Muscle Cramps [] Muscle Twitches  [x] Denies all unless marked  Sleep: [] Insomnia [] Snoring [] Restless Legs [] Sleep Apnea  [] Daytime Sleepiness  [x] Denies all unless marked  Skin:[] Rash [] Skin Discoloration [x] Denies all unless marked   Neurological: []Visual Disturbance/Memory Loss [] Loss of Balance [] Slurred Speech/Weakness [] Seizures  [] Vertigo/Dizziness [x] Denies all unless marked      
changes made.    Plan    Call for difficulties    Return in about 3 months (around 1/3/2025).    (Please note that portions of this note were completed with a voice recognition program. Efforts were made to edit the dictations but occasionally words are mis-transcribed.)

## 2024-11-04 DIAGNOSIS — G62.9 NEUROPATHY: ICD-10-CM

## 2024-11-04 NOTE — TELEPHONE ENCOUNTER
Requested Prescriptions     Pending Prescriptions Disp Refills    HYDROcodone-acetaminophen (NORCO) 7.5-325 MG per tablet 30 tablet 0     Sig: Take 1 tablet by mouth as needed for Pain for up to 30 days. Must last 30 days.       Last Office Visit:  10/3/2024  Next Office Visit:  1/8/2025  Last Medication Refill:  10/3/2024 with 0 RENETTA Acosta up to date:  11/4/2024    *RX updated to reflect   11/4/2024  fill date*

## 2024-11-05 RX ORDER — HYDROCODONE BITARTRATE AND ACETAMINOPHEN 7.5; 325 MG/1; MG/1
1 TABLET ORAL AS NEEDED
Qty: 30 TABLET | Refills: 0 | Status: SHIPPED | OUTPATIENT
Start: 2024-11-05 | End: 2024-12-05

## 2024-12-05 DIAGNOSIS — G62.9 NEUROPATHY: ICD-10-CM

## 2024-12-05 NOTE — TELEPHONE ENCOUNTER
Requested Prescriptions     Pending Prescriptions Disp Refills    HYDROcodone-acetaminophen (NORCO) 7.5-325 MG per tablet 30 tablet 0     Sig: Take 1 tablet by mouth as needed for Pain for up to 30 days. Must last 30 days.       Last Office Visit:  10/3/2024  Next Office Visit:  1/8/2025  Last Medication Refill:  11/5/2024   Dave up to date:  11/4/2024    *RX updated to reflect   12/5/2024  fill date*

## 2024-12-06 RX ORDER — HYDROCODONE BITARTRATE AND ACETAMINOPHEN 7.5; 325 MG/1; MG/1
1 TABLET ORAL AS NEEDED
Qty: 30 TABLET | Refills: 0 | Status: SHIPPED | OUTPATIENT
Start: 2024-12-06 | End: 2025-01-05

## 2025-01-08 ENCOUNTER — OFFICE VISIT (OUTPATIENT)
Dept: NEUROLOGY | Age: 65
End: 2025-01-08
Payer: MEDICAID

## 2025-01-08 VITALS
HEIGHT: 65 IN | RESPIRATION RATE: 16 BRPM | OXYGEN SATURATION: 98 % | WEIGHT: 180 LBS | SYSTOLIC BLOOD PRESSURE: 128 MMHG | DIASTOLIC BLOOD PRESSURE: 74 MMHG | BODY MASS INDEX: 29.99 KG/M2 | HEART RATE: 87 BPM

## 2025-01-08 DIAGNOSIS — G89.29 CHRONIC LEFT-SIDED LOW BACK PAIN WITH LEFT-SIDED SCIATICA: ICD-10-CM

## 2025-01-08 DIAGNOSIS — M54.42 CHRONIC LEFT-SIDED LOW BACK PAIN WITH LEFT-SIDED SCIATICA: ICD-10-CM

## 2025-01-08 DIAGNOSIS — G62.9 NEUROPATHY: Primary | ICD-10-CM

## 2025-01-08 DIAGNOSIS — M48.062 SPINAL STENOSIS OF LUMBAR REGION WITH NEUROGENIC CLAUDICATION: ICD-10-CM

## 2025-01-08 DIAGNOSIS — Z86.39 HISTORY OF DIABETES MELLITUS: ICD-10-CM

## 2025-01-08 PROCEDURE — 99214 OFFICE O/P EST MOD 30 MIN: CPT | Performed by: PSYCHIATRY & NEUROLOGY

## 2025-01-08 NOTE — PROGRESS NOTES
REVIEW OF SYSTEMS    Constitutional: []Fever []Sweat []Chills [] Recent Injury [x] Denies all unless marked  HEENT:[]Headache  [] Head Injury/Hearing Loss  [] Sore Throat  [] Ear Ache/Dizziness  [x] Denies all unless marked  Spine:  [] Neck pain  [] Back pain  [] Sciaticia  [x] Denies all unless marked  Cardiovascular:[]Heart Disease []Chest Pain [] Palpitations  [x] Denies all unless marked  Pulmonary: []Shortness of Breath []Cough   [x] Denies all unless marke  Gastrointestinal: []Nausea  []Vomiting  []Abdominal Pain  []Constipation  []Diarrhea  []Dark Bloody Stools  [x] Denies all unless marked  Psychiatric/Behavioral:[] Depression [] Anxiety [x] Denies all unless marked  Genitourinary:   [] Frequency  [] Urgency  [] Incontinence [] Pain with Urination  [x] Denies all unless marked  Extremities: [x]Pain  [x]Swelling  [x] Denies all unless marked  Musculoskeletal: [] Muscle Pain  [] Joint Pain  [] Arthritis [] Muscle Cramps [] Muscle Twitches  [x] Denies all unless marked  Sleep: [] Insomnia [] Snoring [] Restless Legs [] Sleep Apnea  [] Daytime Sleepiness  [x] Denies all unless marked  Skin:[] Rash [] Skin Discoloration [x] Denies all unless marked   Neurological: []Visual Disturbance/Memory Loss [] Loss of Balance [] Slurred Speech/Weakness [] Seizures  [] Vertigo/Dizziness [x] Denies all unless marked     
accessory muscles  Cardiovascular- RRR  Musculoskeletal - no significant wasting of muscles noted, no bony deformities, gait no gross ataxia  Extremities-no clubbing, cyanosis or edema  Skin - warm, dry, and intact.  No rash, erythema, or pallor.  Psychiatric - mood, affect, and behavior appear normal.      Neurological exam  Awake, alert, fluent oriented x 3 appropriate affect  Attention and concentration appear appropriate  Recent and remote memory appears unremarkable  Speech normal without dysarthria  No clear issues with language of fund of knowledge    Cranial Nerve Exam   CN II- Visual fields grossly unremarkable. VA adequate.   CN III, IV,VI- PERRLA, EOMI, No nystagmus, conjugate eye movements, no ptosis  CN VII-slight facial asymmetry  CN VIII-Hearing intact   CN IX and X- Palate elevates in midline  CN XI-good shoulder shrug  CN XII-Tongue midline with no fasciculations or fibrillations    Motor Exam  V/V throughout upper and lower extremities bilaterally, no cogwheeling, normal tone      Reflexes trace left ankle.  1+ KJ and left AJ    Tremors- no tremors in hands or head noted    Gait  Slow and antalgic    Coordination  Finger to nose and IAN-unremarkable      Assessment    ICD-10-CM    1. Neuropathy  G62.9       2. Chronic left-sided low back pain with left-sided sciatica  M54.42     G89.29       3. Spinal stenosis of lumbar region with neurogenic claudication  M48.062       4. History of diabetes mellitus  Z86.39                             Patient has a severe diabetic neuropathy, along with chronic progressive LBP.  He will continue on Cymbalta and Norco each evening.  Little else for me to add currently..  Diabetes stable  No changes made.    Plan    Call for difficulties    Return in about 3 months (around 4/8/2025).    (Please note that portions of this note were completed with a voice recognition program. Efforts were made to edit the dictations but occasionally words are mis-transcribed.)

## 2025-01-09 DIAGNOSIS — G62.9 NEUROPATHY: ICD-10-CM

## 2025-01-09 NOTE — TELEPHONE ENCOUNTER
Patient called into office requesting RF, patient thought this was going to be called in at his appointment yesterday       Requested Prescriptions     Pending Prescriptions Disp Refills    HYDROcodone-acetaminophen (NORCO) 7.5-325 MG per tablet 30 tablet 0     Sig: Take 1 tablet by mouth as needed for Pain for up to 30 days. Must last 30 days.       Last Office Visit:  1/8/2025  Next Office Visit:  4/9/2025  Last Medication Refill:  12/6/2024 with 0 RF   Dave up to date:  11/4/2024    *RX updated to reflect   1/9/2025  fill date*

## 2025-01-10 RX ORDER — HYDROCODONE BITARTRATE AND ACETAMINOPHEN 7.5; 325 MG/1; MG/1
1 TABLET ORAL AS NEEDED
Qty: 30 TABLET | Refills: 0 | Status: SHIPPED | OUTPATIENT
Start: 2025-01-10 | End: 2025-02-09

## 2025-02-10 DIAGNOSIS — G62.9 NEUROPATHY: ICD-10-CM

## 2025-02-10 RX ORDER — HYDROCODONE BITARTRATE AND ACETAMINOPHEN 7.5; 325 MG/1; MG/1
1 TABLET ORAL AS NEEDED
Qty: 30 TABLET | Refills: 0 | Status: SHIPPED | OUTPATIENT
Start: 2025-02-10 | End: 2025-03-12

## 2025-02-10 NOTE — TELEPHONE ENCOUNTER
Navid Quiroz has requested a refill on his medication.      Last office visit : 1/8/2025   Next office visit : 4/9/2025   Last medication refill : 1/10/25  Dave : up to date       Requested Prescriptions     Pending Prescriptions Disp Refills    HYDROcodone-acetaminophen (NORCO) 7.5-325 MG per tablet 30 tablet 0     Sig: Take 1 tablet by mouth as needed for Pain for up to 30 days. Must last 30 days.

## 2025-03-11 DIAGNOSIS — G62.9 NEUROPATHY: ICD-10-CM

## 2025-03-11 NOTE — TELEPHONE ENCOUNTER
Requested Prescriptions     Pending Prescriptions Disp Refills    HYDROcodone-acetaminophen (NORCO) 7.5-325 MG per tablet 30 tablet 0     Sig: Take 1 tablet by mouth as needed for Pain for up to 30 days. Must last 30 days.       Last Office Visit: 10/03/2024  Next Office Visit: 4/9/2025  Last Medication Refill: 2/08/2025

## 2025-03-12 RX ORDER — HYDROCODONE BITARTRATE AND ACETAMINOPHEN 7.5; 325 MG/1; MG/1
1 TABLET ORAL AS NEEDED
Qty: 30 TABLET | Refills: 0 | Status: SHIPPED | OUTPATIENT
Start: 2025-03-12 | End: 2025-04-11

## 2025-04-09 ENCOUNTER — OFFICE VISIT (OUTPATIENT)
Dept: NEUROLOGY | Age: 65
End: 2025-04-09

## 2025-04-09 VITALS
DIASTOLIC BLOOD PRESSURE: 74 MMHG | BODY MASS INDEX: 29.99 KG/M2 | HEART RATE: 86 BPM | SYSTOLIC BLOOD PRESSURE: 136 MMHG | WEIGHT: 180 LBS | HEIGHT: 65 IN | OXYGEN SATURATION: 96 % | RESPIRATION RATE: 16 BRPM

## 2025-04-09 DIAGNOSIS — Z86.39 HISTORY OF DIABETES MELLITUS: ICD-10-CM

## 2025-04-09 DIAGNOSIS — G62.9 NEUROPATHY: ICD-10-CM

## 2025-04-09 DIAGNOSIS — Z79.899 LONG TERM USE OF DRUG: Primary | ICD-10-CM

## 2025-04-09 DIAGNOSIS — M48.062 SPINAL STENOSIS OF LUMBAR REGION WITH NEUROGENIC CLAUDICATION: ICD-10-CM

## 2025-04-09 RX ORDER — HYDROCODONE BITARTRATE AND ACETAMINOPHEN 7.5; 325 MG/1; MG/1
1 TABLET ORAL AS NEEDED
Qty: 30 TABLET | Refills: 0 | Status: SHIPPED | OUTPATIENT
Start: 2025-04-09 | End: 2025-05-09
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
## 2025-05-12 DIAGNOSIS — G62.9 NEUROPATHY: ICD-10-CM

## 2025-05-12 RX ORDER — HYDROCODONE BITARTRATE AND ACETAMINOPHEN 7.5; 325 MG/1; MG/1
1 TABLET ORAL AS NEEDED
Qty: 30 TABLET | Refills: 0 | Status: SHIPPED | OUTPATIENT
Start: 2025-05-12 | End: 2025-06-11

## 2025-05-12 NOTE — TELEPHONE ENCOUNTER
Navid Quiroz has requested a refill on his medication.      Last office visit : 04/09/2025   Next office visit : 07/09/2025  Last medication refill : 04/09/2025  Dave : 04/07/2025      Requested Prescriptions     Pending Prescriptions Disp Refills    HYDROcodone-acetaminophen (NORCO) 7.5-325 MG per tablet 30 tablet 0     Sig: Take 1 tablet by mouth as needed for Pain for up to 30 days. Must last 30 days.

## 2025-06-12 DIAGNOSIS — G62.9 NEUROPATHY: ICD-10-CM

## 2025-06-12 RX ORDER — HYDROCODONE BITARTRATE AND ACETAMINOPHEN 7.5; 325 MG/1; MG/1
1 TABLET ORAL AS NEEDED
Qty: 30 TABLET | Refills: 0 | Status: SHIPPED | OUTPATIENT
Start: 2025-06-12 | End: 2025-07-12

## 2025-06-12 NOTE — TELEPHONE ENCOUNTER
Requested Prescriptions     Pending Prescriptions Disp Refills    HYDROcodone-acetaminophen (NORCO) 7.5-325 MG per tablet 30 tablet 0     Sig: Take 1 tablet by mouth as needed for Pain for up to 30 days. Must last 30 days.       Last Office Visit:  4/9/2025  Next Office Visit:  7/9/2025  Last Medication Refill:  5/12/2025  Dave up to date:  up to date    *RX updated to reflect   6/12/2025  fill date*

## 2025-07-09 ENCOUNTER — OFFICE VISIT (OUTPATIENT)
Dept: NEUROLOGY | Age: 65
End: 2025-07-09
Payer: MEDICAID

## 2025-07-09 VITALS
HEART RATE: 81 BPM | RESPIRATION RATE: 16 BRPM | SYSTOLIC BLOOD PRESSURE: 124 MMHG | HEIGHT: 65 IN | OXYGEN SATURATION: 97 % | WEIGHT: 180 LBS | BODY MASS INDEX: 29.99 KG/M2 | DIASTOLIC BLOOD PRESSURE: 70 MMHG

## 2025-07-09 DIAGNOSIS — Z79.899 LONG TERM USE OF DRUG: ICD-10-CM

## 2025-07-09 DIAGNOSIS — Z86.39 HISTORY OF DIABETES MELLITUS: ICD-10-CM

## 2025-07-09 DIAGNOSIS — G62.9 NEUROPATHY: ICD-10-CM

## 2025-07-09 DIAGNOSIS — M48.062 SPINAL STENOSIS OF LUMBAR REGION WITH NEUROGENIC CLAUDICATION: Primary | ICD-10-CM

## 2025-07-09 PROCEDURE — 99213 OFFICE O/P EST LOW 20 MIN: CPT | Performed by: PSYCHIATRY & NEUROLOGY

## 2025-07-09 RX ORDER — HYDROCODONE BITARTRATE AND ACETAMINOPHEN 7.5; 325 MG/1; MG/1
1 TABLET ORAL AS NEEDED
Qty: 30 TABLET | Refills: 0 | Status: SHIPPED | OUTPATIENT
Start: 2025-07-09 | End: 2025-08-08

## 2025-07-09 NOTE — PROGRESS NOTES
REVIEW OF SYSTEMS    Constitutional: []Fever []Sweat []Chills [] Recent Injury [x] Denies all unless marked  HEENT:[]Headache  [] Head Injury/Hearing Loss  [] Sore Throat  [] Ear Ache/Dizziness  [x] Denies all unless marked  Spine:  [] Neck pain  [] Back pain  [] Sciaticia  [x] Denies all unless marked  Cardiovascular:[]Heart Disease []Chest Pain [] Palpitations  [x] Denies all unless marked  Pulmonary: []Shortness of Breath []Cough   [x] Denies all unless marke  Gastrointestinal: []Nausea  []Vomiting  []Abdominal Pain  []Constipation  []Diarrhea  []Dark Bloody Stools  [x] Denies all unless marked  Psychiatric/Behavioral:[] Depression [] Anxiety [x] Denies all unless marked  Genitourinary:   [] Frequency  [] Urgency  [] Incontinence [] Pain with Urination  [x] Denies all unless marked  Extremities: [x]Pain  []Swelling  [x] Denies all unless marked  Musculoskeletal: [] Muscle Pain  [] Joint Pain  [] Arthritis [] Muscle Cramps [] Muscle Twitches  [x] Denies all unless marked  Sleep: [] Insomnia [] Snoring [] Restless Legs [] Sleep Apnea  [] Daytime Sleepiness  [x] Denies all unless marked  Skin:[] Rash [] Skin Discoloration [x] Denies all unless marked   Neurological: []Visual Disturbance/Memory Loss [] Loss of Balance [] Slurred Speech/Weakness [] Seizures  [] Vertigo/Dizziness [x] Denies all unless marked     
masses, or lesions over external nose or ears, no atrophy of tongue  Neck-symmetric, no masses noted, no jugular vein distension.  No bruits noted.  Respiration- chest wall appears symmetric, good expansion,   normal effort without use of accessory muscles  Cardiovascular- RRR  Musculoskeletal - no significant wasting of muscles noted, no bony deformities, gait no gross ataxia  Extremities-no clubbing, cyanosis or edema  Skin - warm, dry, and intact.  No rash, erythema, or pallor.  Psychiatric - mood, affect, and behavior appear normal.      Neurological exam  Awake, alert, fluent oriented x 3 appropriate affect  Attention and concentration appear appropriate  Recent and remote memory appears unremarkable  Speech normal without dysarthria  No clear issues with language of fund of knowledge    Cranial Nerve Exam   CN II- Visual fields grossly unremarkable. VA adequate.   CN III, IV,VI- PERRLA, EOMI, No nystagmus, conjugate eye movements, no ptosis  CN VII-slight facial asymmetry  CN VIII-Hearing intact   CN IX and X- Palate elevates in midline  CN XI-good shoulder shrug  CN XII-Tongue midline with no fasciculations or fibrillations    Motor Exam  V/V throughout upper and lower extremities bilaterally, no cogwheeling, normal tone      Reflexes trace left ankle.  1+ KJ and left AJ    Tremors- no tremors in hands or head noted    Gait  Slow and antalgic    Coordination  Finger to nose and IAN-unremarkable      Assessment    ICD-10-CM    1. Spinal stenosis of lumbar region with neurogenic claudication  M48.062       2. Neuropathy  G62.9 HYDROcodone-acetaminophen (NORCO) 7.5-325 MG per tablet      3. History of diabetes mellitus  Z86.39       4. Long term use of drug  Z79.899                               Patient has a severe diabetic neuropathy, along with chronic progressive LBP.  He will continue on Cymbalta and Norco each evening.  Consider Lyrica in the future if needed.  Diabetes stable  [place] refilled..

## 2025-07-21 DIAGNOSIS — G62.9 NEUROPATHY: Primary | ICD-10-CM

## 2025-07-21 RX ORDER — DULOXETIN HYDROCHLORIDE 60 MG/1
60 CAPSULE, DELAYED RELEASE ORAL DAILY
Qty: 90 CAPSULE | Refills: 3 | Status: SHIPPED | OUTPATIENT
Start: 2025-07-21

## 2025-07-21 NOTE — TELEPHONE ENCOUNTER
Requested Prescriptions     Pending Prescriptions Disp Refills    DULoxetine (CYMBALTA) 60 MG extended release capsule 90 capsule 3     Sig: Take 1 capsule by mouth daily       Last Office Visit: 7/9/2025  Next Office Visit: 10/13/2025  Last Medication Refill: 5/21/25

## 2025-08-13 DIAGNOSIS — G62.9 NEUROPATHY: ICD-10-CM

## 2025-08-13 RX ORDER — HYDROCODONE BITARTRATE AND ACETAMINOPHEN 7.5; 325 MG/1; MG/1
1 TABLET ORAL AS NEEDED
Qty: 30 TABLET | Refills: 0 | Status: SHIPPED | OUTPATIENT
Start: 2025-08-13 | End: 2025-09-12